# Patient Record
Sex: FEMALE | Race: WHITE | Employment: FULL TIME | URBAN - METROPOLITAN AREA
[De-identification: names, ages, dates, MRNs, and addresses within clinical notes are randomized per-mention and may not be internally consistent; named-entity substitution may affect disease eponyms.]

---

## 2021-04-08 ENCOUNTER — TELEPHONE (OUTPATIENT)
Dept: FAMILY MEDICINE CLINIC | Age: 41
End: 2021-04-08

## 2021-04-08 NOTE — TELEPHONE ENCOUNTER
Called pt, she states that the OB/GYN placed her on Metformin for and that we may have refilled it once before. I explained that we would need to see her before we prescribe a medication for her. She has zero encounters with the office or [employer] in general.     Transferred to the front to schedule an appt to establish care.  JANE

## 2021-04-08 NOTE — TELEPHONE ENCOUNTER
----- Message from [facility] sent at 4/8/2021 12:17 PM EDT -----  Regarding: AMBER Thacker/telphone  General Message/Vendor Calls    Caller's first and last name:      Reason for call: Question      Callback required yes/no and why Y      Best contact number(s):[phone]      Details to clarify the request: Patient stated that she would like to see if she can go back on the metformin?       Teresa Iqbal

## 2021-04-09 ENCOUNTER — OFFICE VISIT (OUTPATIENT)
Dept: FAMILY MEDICINE CLINIC | Age: 41
End: 2021-04-09
Payer: COMMERCIAL

## 2021-04-09 VITALS
HEART RATE: 72 BPM | SYSTOLIC BLOOD PRESSURE: 120 MMHG | RESPIRATION RATE: 20 BRPM | OXYGEN SATURATION: 100 % | BODY MASS INDEX: 47.09 KG/M2 | DIASTOLIC BLOOD PRESSURE: 78 MMHG | HEIGHT: 66 IN | WEIGHT: 293 LBS | TEMPERATURE: 97.8 F

## 2021-04-09 DIAGNOSIS — Z11.59 SCREENING FOR VIRAL DISEASE: ICD-10-CM

## 2021-04-09 DIAGNOSIS — E66.01 MORBID OBESITY (HCC): ICD-10-CM

## 2021-04-09 DIAGNOSIS — R00.2 PALPITATIONS: Primary | ICD-10-CM

## 2021-04-09 DIAGNOSIS — Z13.220 SCREENING, LIPID: ICD-10-CM

## 2021-04-09 DIAGNOSIS — E55.9 VITAMIN D DEFICIENCY: ICD-10-CM

## 2021-04-09 DIAGNOSIS — K21.9 GASTROESOPHAGEAL REFLUX DISEASE WITHOUT ESOPHAGITIS: ICD-10-CM

## 2021-04-09 PROBLEM — R06.83 SNORING: Status: ACTIVE | Noted: 2020-01-29

## 2021-04-09 PROBLEM — E88.81 EXTREME INSULIN RESISTANCE TYPE B: Status: ACTIVE | Noted: 2020-02-15

## 2021-04-09 PROBLEM — Z80.3 FAMILY HISTORY OF MALIGNANT NEOPLASM OF BREAST: Status: ACTIVE | Noted: 2017-02-07

## 2021-04-09 PROCEDURE — 99204 OFFICE O/P NEW MOD 45 MIN: CPT | Performed by: NURSE PRACTITIONER

## 2021-04-09 PROCEDURE — 93000 ELECTROCARDIOGRAM COMPLETE: CPT | Performed by: NURSE PRACTITIONER

## 2021-04-09 PROCEDURE — 36415 COLL VENOUS BLD VENIPUNCTURE: CPT | Performed by: NURSE PRACTITIONER

## 2021-04-09 RX ORDER — OMEPRAZOLE 40 MG/1
40 CAPSULE, DELAYED RELEASE ORAL DAILY
Qty: 30 CAP | Refills: 1 | Status: SHIPPED | OUTPATIENT
Start: 2021-04-09

## 2021-04-09 RX ORDER — METFORMIN HYDROCHLORIDE 500 MG/1
500 TABLET, EXTENDED RELEASE ORAL 2 TIMES DAILY
Qty: 60 TAB | Refills: 5 | Status: SHIPPED | OUTPATIENT
Start: 2021-04-09

## 2021-04-09 RX ORDER — NORGESTIMATE AND ETHINYL ESTRADIOL 0.25-0.035
KIT ORAL
COMMUNITY

## 2021-04-09 RX ORDER — TRIAMCINOLONE ACETONIDE 1 MG/G
CREAM TOPICAL
COMMUNITY
Start: 2021-03-26

## 2021-04-09 NOTE — PROGRESS NOTES
Chief Complaint   Patient presents with    Medication Refill       HPI:     is a 41 y.o. female here to establish care. She has been in good health and is currently using just birth control pills and Kenalog cream for a dry, itchy rash. Previously, Ms. Zamzam Leone was taking metformin which was prescribed by [facility] for weight loss; she found this medication effective, but stopped taking it when she saw a commercial about SEs of metformin. She would like to start using this again for weight loss. She has been having a burning sensation in her epigastric area on occasion for about the past 3 weeks; does not associate with any particular food or activity. It is relieved with Tums. In addition, she has been having occasional episodes of chest heaviness and palpitations, lasting from a few seconds to minutes; these episodes occur randomly and subside without intervention. Last week, she notes that one episode of palpitations was accompanied by bilateral jaw pain. She gets annual mammograms for strong familial history of breast CA; this is due in June. She gets annual Pap screening with [facility]; due again in August.      Not on File    Current Outpatient Medications   Medication Sig    norgestimate-ethinyl estradioL (Estarylla) 0.25-35 mg-mcg tab Estarylla 0.25 mg-35 mcg tablet   1 tablet by mouth everyday    triamcinolone acetonide (KENALOG) 0.1 % topical cream APPLY A VERY SMALL AMOUNT TOPICALLY TO ACTIVE PATCHES ON LOWER LEGS TWICE DAILY AS NEEDED FOR FLARES    omeprazole (PRILOSEC) 40 mg capsule Take 1 Cap by mouth daily.  metFORMIN ER (GLUCOPHAGE XR) 500 mg tablet Take 1 Tab by mouth two (2) times a day. No current facility-administered medications for this visit. No past medical history on file. Family History   Problem Relation Age of Onset   Emery Rae Cancer Mother     Hypertension Father        ROS:  Denies fever, chills, cough, chest pain, SOB,  nausea, vomiting, diarrhea, dysuria. Denies rashes, wounds, arthralgias, weakness, numbness, visual changes, depression. Denies wt loss, wt gain, hemoptysis, hematochezia or melena. Patient is experiencing chest pain radiating to the jaw and/or down the arms. Physical Examination:    /78 (BP 1 Location: Right arm, BP Patient Position: Sitting, BP Cuff Size: Thigh)   Pulse 72   Temp 97.8 °F (36.6 °C) (Temporal)   Resp 20   Ht 5' 6\" (1.676 m)   Wt 297 lb 9.6 oz (135 kg)   SpO2 100%   BMI 48.03 kg/m²     Wt Readings from Last 3 Encounters:   04/09/21 297 lb 9.6 oz (135 kg)       Constitutional: WDWN Female in no acute distress  HENT:  NC/AT  EYES: EOMI, PERRL  Neck:  Supple, no JVD, mass or bruit. No thyromegaly. Respiratory:  Respirations even and unlabored without use of accessory muscles, CTA throughout without wheezes, rales, rubs or rhonchi. Symmetrical chest expansion. Cardiac: RRR, no murmur  Abdomen:  soft, nontender without palpable HSM   Musculoskeletal:  No cyanosis, clubbing or edema of extremities. Moves all extremities without difficulty. Neurologic:  Smooth, even gait without assistance, CN 2-12 grossly intact. Skin: intact and warm to the touch, no rash   Lymphadenopathy: no cervical or supraclavicular nodes  Psych: Pleasant and appropriate. Judgment normal. Alert and oriented x 3. EKG: normal EKG, normal sinus rhythm, there are no previous tracings available for comparison. ASSESSMENT AND PLAN:       ICD-10-CM ICD-9-CM    1. Palpitations  R00.2 785.1 AMB POC EKG ROUTINE W/ 12 LEADS, INTER & REP      COLLECTION VENOUS BLOOD,VENIPUNCTURE      CBC WITH AUTOMATED DIFF      METABOLIC PANEL, COMPREHENSIVE      TSH 3RD GENERATION   2. Morbid obesity (HCC)  E66.01 278.01 COLLECTION VENOUS BLOOD,VENIPUNCTURE      CBC WITH AUTOMATED DIFF      LIPID PANEL      METABOLIC PANEL, COMPREHENSIVE      TSH 3RD GENERATION      HEMOGLOBIN A1C WITH EAG      metFORMIN ER (GLUCOPHAGE XR) 500 mg tablet   3.  Gastroesophageal reflux disease without esophagitis  K21.9 530.81 omeprazole (PRILOSEC) 40 mg capsule   4. Vitamin D deficiency  E55.9 268.9 VITAMIN D, 25 HYDROXY   5. Screening for viral disease  Z11.59 V73.99 HCV AB W/RFLX TO [place]EN   6. Screening, lipid  Z13.220 V77.91 LIPID PANEL       Check labs today. Reviewed records from [facility]. Request records from former PCP. Discussed weight management, restart metformin. Discussed MOA/potential SEs, start at 1 daily and increase to BID after 1 week if tolerating well. Refer to Dr. Cullen Denney for palpitations, chest tightness radiating to jaw. EKG today. Discussed GERD sx. Start Prilosec x6-8 weeks. Discussed lifestyle modifications: elevate head of bed, avoid laying down for 2-3  hours after meal, avoid acidic foods including tomatoes, citrus, chocolate, peppermint, EtOH, carbonated drinks, coffee, juices especially at evening meal. Limit NSAID use. Patient aware of plan of care and verbalized understanding. Questions answered. RTC 3 months or sooner if needed.     Erica Stallworth, AMBER

## 2021-04-09 NOTE — PATIENT INSTRUCTIONS
Palpitations: Care Instructions  Your Care Instructions     Heart palpitations are the uncomfortable sensation that your heart is beating fast or irregularly. You might feel pounding or fluttering in your chest. It might feel like your heart is skipping a beat. Although palpitations may be caused by a heart problem, they also occur because of stress, fatigue, or use of alcohol, caffeine, or nicotine. Many medicines, including diet pills, antihistamines, decongestants, and some herbal products, can cause heart palpitations. Nearly everyone has palpitations from time to time. Depending on your symptoms, your doctor may need to do more tests to try to find the cause of your palpitations. Follow-up care is a key part of your treatment and safety. Be sure to make and go to all appointments, and call your doctor if you are having problems. It's also a good idea to know your test results and keep a list of the medicines you take. How can you care for yourself at home? · Avoid caffeine, nicotine, and excess alcohol. · Do not take illegal drugs, such as methamphetamines and cocaine. · Do not take weight loss or diet medicines unless you talk with your doctor first.  · Get plenty of sleep. · Do not overeat. · If you have palpitations again, take deep breaths and try to relax. This may slow a racing heart. · If you start to feel lightheaded, lie down to avoid injuries that might result if you pass out and fall down. · Keep a record of your palpitations and bring it to your next doctor's appointment. Write down:  ? The date and time. ? Your pulse. (If your heart is beating fast, it may be hard to count your pulse.)  ? What you were doing when the palpitations started. ? How long the palpitations lasted. ? Any other symptoms. · If an activity causes palpitations, slow down or stop. Talk to your doctor before you do that activity again. · Take your medicines exactly as prescribed.  Call your doctor if you think you are having a problem with your medicine. When should you call for help? Call 911 anytime you think you may need emergency care. For example, call if:    · You passed out (lost consciousness).     · You have symptoms of a heart attack. These may include:  ? Chest pain or pressure, or a strange feeling in the chest.  ? Sweating. ? Shortness of breath. ? Pain, pressure, or a strange feeling in the back, neck, jaw, or upper belly or in one or both shoulders or arms. ? Lightheadedness or sudden weakness. ? A fast or irregular heartbeat. After you call 911, the  may tell you to chew 1 adult-strength or 2 to 4 low-dose aspirin. Wait for an ambulance. Do not try to drive yourself.     · You have symptoms of a stroke. These may include:  ? Sudden numbness, tingling, weakness, or loss of movement in your face, arm, or leg, especially on only one side of your body. ? Sudden vision changes. ? Sudden trouble speaking. ? Sudden confusion or trouble understanding simple statements. ? Sudden problems with walking or balance. ? A sudden, severe headache that is different from past headaches. Call your doctor now or seek immediate medical care if:    · You have heart palpitations and:  ? Are dizzy or lightheaded, or you feel like you may faint. ? Have new or increased shortness of breath. Watch closely for changes in your health, and be sure to contact your doctor if:    · You continue to have heart palpitations. Where can you learn more? Go to http://www.gray.com/  Enter R508 in the search box to learn more about \"Palpitations: Care Instructions. \"  Current as of: August 31, 2020               Content Version: 12.8  © 2344-1443 [employer]. Care instructions adapted under license by [employer] (which disclaims liability or warranty for this information).  If you have questions about a medical condition or this instruction, always ask your healthcare professional. [address] any warranty or liability for your use of this information.

## 2021-04-10 LAB
25(OH)D3 SERPL-MCNC: 20.4 NG/ML (ref 30–100)
ALBUMIN SERPL-MCNC: 4.2 G/DL (ref 3.5–5)
ALBUMIN/GLOB SERPL: 1 {RATIO} (ref 1.1–2.2)
ALP SERPL-CCNC: 57 U/L (ref 45–117)
ALT SERPL-CCNC: 35 U/L (ref 12–78)
ANION GAP SERPL CALC-SCNC: 6 MMOL/L (ref 5–15)
AST SERPL-CCNC: 19 U/L (ref 15–37)
BASOPHILS # BLD: 0.1 K/UL (ref 0–0.1)
BASOPHILS NFR BLD: 1 % (ref 0–1)
BILIRUB SERPL-MCNC: 0.7 MG/DL (ref 0.2–1)
BUN SERPL-MCNC: 14 MG/DL (ref 6–20)
BUN/CREAT SERPL: 21 (ref 12–20)
CALCIUM SERPL-MCNC: 9.1 MG/DL (ref 8.5–10.1)
CHLORIDE SERPL-SCNC: 107 MMOL/L (ref 97–108)
CHOLEST SERPL-MCNC: 267 MG/DL
CO2 SERPL-SCNC: 25 MMOL/L (ref 21–32)
CREAT SERPL-MCNC: 0.68 MG/DL (ref 0.55–1.02)
DIFFERENTIAL METHOD BLD: ABNORMAL
EOSINOPHIL # BLD: 0.1 K/UL (ref 0–0.4)
EOSINOPHIL NFR BLD: 1 % (ref 0–7)
ERYTHROCYTE [DISTWIDTH] IN BLOOD BY AUTOMATED COUNT: 13.1 % (ref 11.5–14.5)
EST. AVERAGE GLUCOSE BLD GHB EST-MCNC: 91 MG/DL
GLOBULIN SER CALC-MCNC: 4.1 G/DL (ref 2–4)
GLUCOSE SERPL-MCNC: 76 MG/DL (ref 65–100)
HBA1C MFR BLD: 4.8 % (ref 4–5.6)
HCT VFR BLD AUTO: 48 % (ref 35–47)
HDLC SERPL-MCNC: 84 MG/DL
HDLC SERPL: 3.2 {RATIO} (ref 0–5)
HGB BLD-MCNC: 15.7 G/DL (ref 11.5–16)
IMM GRANULOCYTES # BLD AUTO: 0 K/UL (ref 0–0.04)
IMM GRANULOCYTES NFR BLD AUTO: 0 % (ref 0–0.5)
LDLC SERPL CALC-MCNC: 159.2 MG/DL (ref 0–100)
LIPID PROFILE,FLP: ABNORMAL
LYMPHOCYTES # BLD: 2.7 K/UL (ref 0.8–3.5)
LYMPHOCYTES NFR BLD: 34 % (ref 12–49)
MCH RBC QN AUTO: 29.5 PG (ref 26–34)
MCHC RBC AUTO-ENTMCNC: 32.7 G/DL (ref 30–36.5)
MCV RBC AUTO: 90.1 FL (ref 80–99)
MONOCYTES # BLD: 0.6 K/UL (ref 0–1)
MONOCYTES NFR BLD: 7 % (ref 5–13)
NEUTS SEG # BLD: 4.7 K/UL (ref 1.8–8)
NEUTS SEG NFR BLD: 57 % (ref 32–75)
NRBC # BLD: 0 K/UL (ref 0–0.01)
NRBC BLD-RTO: 0 PER 100 WBC
PLATELET # BLD AUTO: 237 K/UL (ref 150–400)
PMV BLD AUTO: 10.9 FL (ref 8.9–12.9)
POTASSIUM SERPL-SCNC: 3.7 MMOL/L (ref 3.5–5.1)
PROT SERPL-MCNC: 8.3 G/DL (ref 6.4–8.2)
RBC # BLD AUTO: 5.33 M/UL (ref 3.8–5.2)
SODIUM SERPL-SCNC: 138 MMOL/L (ref 136–145)
TRIGL SERPL-MCNC: 119 MG/DL (ref ?–150)
TSH SERPL DL<=0.05 MIU/L-ACNC: 3.01 UIU/ML (ref 0.36–3.74)
VLDLC SERPL CALC-MCNC: 23.8 MG/DL
WBC # BLD AUTO: 8.1 K/UL (ref 3.6–11)

## 2021-04-12 LAB
HCV AB S/CO SERPL IA: <0.1 S/CO RATIO (ref 0–0.9)
HCV AB SERPL QL IA: NORMAL

## 2021-04-12 NOTE — PROGRESS NOTES
Patient verified by stating name and date of birth.  Patient informed of lab results and states understanding per [place]

## 2021-04-12 NOTE — PROGRESS NOTES
Please call patient. No diabetes, no prediabetes. Vitamin D is low. Make sure she is taking 2000 units of OTC vitamin D daily. Thyroid, liver, kidneys, electrolytes look good. Her cholesterol is way too high. This increases her risk of heart attack or stroke. Work hard on diet, exercise, weight loss, and re-check in 6 months.

## 2021-04-13 ENCOUNTER — OFFICE VISIT (OUTPATIENT)
Dept: CARDIOLOGY CLINIC | Age: 41
End: 2021-04-13
Payer: COMMERCIAL

## 2021-04-13 ENCOUNTER — CLINICAL SUPPORT (OUTPATIENT)
Dept: CARDIOLOGY CLINIC | Age: 41
End: 2021-04-13

## 2021-04-13 VITALS
OXYGEN SATURATION: 98 % | HEART RATE: 73 BPM | RESPIRATION RATE: 18 BRPM | WEIGHT: 293 LBS | SYSTOLIC BLOOD PRESSURE: 140 MMHG | BODY MASS INDEX: 47.09 KG/M2 | HEIGHT: 66 IN | DIASTOLIC BLOOD PRESSURE: 90 MMHG | TEMPERATURE: 98.2 F

## 2021-04-13 DIAGNOSIS — R00.2 PALPITATIONS: ICD-10-CM

## 2021-04-13 DIAGNOSIS — K21.9 GASTROESOPHAGEAL REFLUX DISEASE WITHOUT ESOPHAGITIS: ICD-10-CM

## 2021-04-13 DIAGNOSIS — R07.2 PRECORDIAL PAIN: Primary | ICD-10-CM

## 2021-04-13 DIAGNOSIS — E66.01 MORBID OBESITY (HCC): ICD-10-CM

## 2021-04-13 DIAGNOSIS — E78.5 DYSLIPIDEMIA: ICD-10-CM

## 2021-04-13 DIAGNOSIS — Z82.49 FH: CAD (CORONARY ARTERY DISEASE): ICD-10-CM

## 2021-04-13 PROCEDURE — 99203 OFFICE O/P NEW LOW 30 MIN: CPT | Performed by: INTERNAL MEDICINE

## 2021-04-13 PROCEDURE — 93227 XTRNL ECG REC<48 HR R&I: CPT | Performed by: INTERNAL MEDICINE

## 2021-04-13 PROCEDURE — 93225 XTRNL ECG REC<48 HRS REC: CPT | Performed by: INTERNAL MEDICINE

## 2021-04-13 NOTE — PROGRESS NOTES
Verified patient with two patient identifiers. Medications reviewed/approved by Dr. Osmar Demarco. Chief Complaint   Patient presents with    New Patient     Referred by Thomas Guardado NP    Palpitations     \"starts with a burning in the stomach, pressure in the chest, heart races and then slows. Pain radiates to the jaw. \" w/ rest, activity. Omepreazole is not helping per pt. 1. Have you been to the ER, urgent care clinic since your last visit? Hospitalized since your last visit? new pt.    2. Have you seen or consulted any other health care providers outside of the [address] [place] [address] since your last visit? Include any pap smears or colon screening. New pt.

## 2021-04-13 NOTE — PROGRESS NOTES
Rita Norton is a 41 y.o. female is here for cardiac evaluation--palpitations, chest pain. No prior cardiac hx. Hx obesity (on metformin for weight loss, not diabetic), seen by PCP recently. Sx of palpitations--lasting mins to longer at times, heartburn/indigestion with CP radiating to neck/jaw--non-exertional.  Labs as noted--dyslipidemia, glc intolerance/insulin resistance. +FH CAD (father). .  The patient denies shortness of breath, orthopnea, PND, LE edema, , syncope, presyncope or fatigue. Patient Active Problem List    Diagnosis Date Noted    Extreme insulin resistance type B 02/15/2020    Vitamin D deficiency 02/15/2020    Snoring 01/29/2020    Family history of malignant neoplasm of breast 02/07/2017    Morbid obesity ([facility] [address] 06/10/2015    Stress incontinence 02/26/2014      Jana Kilpatrick NP  No past medical history on file.    Past Surgical History:   Procedure Laterality Date    HX LITHOTRIPSY  2016     No Known Allergies   Family History   Problem Relation Age of Onset    Cancer Mother     Hypertension Father       Social History     Socioeconomic History    Marital status:      Spouse name: Not on file    Number of children: 2    Years of education: 15    Highest education level: Not on file   Occupational History    Not on file   Social Needs    Financial resource strain: Not hard at all   [employer] insecurity     Worry: Never true     Inability: Never true   [employer] needs     Medical: No     Non-medical: Not on file   Tobacco Use    Smoking status: Never Smoker    Smokeless tobacco: Never Used   Substance and Sexual Activity    Alcohol use: Yes     Frequency: 2-4 times a month     Drinks per session: 1 or 2     Binge frequency: Never    Drug use: Never    Sexual activity: Not on file   Lifestyle    Physical activity     Days per week: 0 days     Minutes per session: Not on file    Stress: Not at all   Relationships    Social connections Talks on phone: More than three times a week     Gets together: More than three times a week     Attends Alevism service: Never     Active member of club or organization: No     Attends meetings of clubs or organizations: Never     Relationship status:     Intimate partner violence     Fear of current or ex partner: No     Emotionally abused: No     Physically abused: No     Forced sexual activity: No   Other Topics Concern    Not on file   Social History Narrative    Not on file      Current Outpatient Medications   Medication Sig    norgestimate-ethinyl estradioL (Estarylla) 0.25-35 mg-mcg tab Estarylla 0.25 mg-35 mcg tablet   1 tablet by mouth everyday    triamcinolone acetonide (KENALOG) 0.1 % topical cream APPLY A VERY SMALL AMOUNT TOPICALLY TO ACTIVE PATCHES ON LOWER LEGS TWICE DAILY AS NEEDED FOR FLARES    omeprazole (PRILOSEC) 40 mg capsule Take 1 Cap by mouth daily.  metFORMIN ER (GLUCOPHAGE XR) 500 mg tablet Take 1 Tab by mouth two (2) times a day. (Patient taking differently: Take 500 mg by mouth daily (with dinner). Every day this week and bid next (starting April 19th)  Indications: weight loss)     No current facility-administered medications for this visit. Review of Symptoms:    CONST  No weight change. No fever, chills, sweats    ENT No visual changes, URI sx, sore throat    CV  See HPI   RESP  No cough, or sputum, wheezing. Also see HPI   GI  No abdominal pain or change in bowel habits. No heartburn or dysphagia. No melena or rectal bleeding.   No dysuria, urgency, frequency, hematuria   MSKEL  No joint pain, swelling. No muscle pain. SKIN  No rash or lesions. NEURO  No headache, syncope, or seizure. No weakness, loss of sensation, or paresthesias. PSYCH  No low mood or depression  No anxiety. HE/LYMPH  No easy bruising, abnormal bleeding, or enlarged glands.         Physical ExamPhysical Exam:    Visit Vitals  BP (!) 140/90 (BP 1 Location: Left lower arm, BP Patient Position: Sitting, BP Cuff Size: Adult)   Pulse 73   Temp 98.2 °F (36.8 °C) (Temporal)   Resp 18   Ht 5' 6\" (1.676 m)   Wt 299 lb (135.6 kg)   SpO2 98% Comment: ra   BMI 48.26 kg/m²     Gen: NAD  HEENT:  PERRL, throat clear  Neck: no adenopathy, no thyromegaly, no JVD   Heart:  Regular,Nl S1S2,  no murmur, gallop or rub. Lungs:  clear  Abdomen:   Soft, non-tender, bowel sounds are active. Extremities:  No edema  Pulse: symmetric  Neuro: A&O times 3, No focal neuro deficits    Cardiographics    ECG: from 4/9/21--NSR, wnl    Labs:   Lab Results   Component Value Date/Time    Sodium 138 04/09/2021 08:26 PM    Potassium 3.7 04/09/2021 08:26 PM    Chloride 107 04/09/2021 08:26 PM    CO2 25 04/09/2021 08:26 PM    Anion gap 6 04/09/2021 08:26 PM    Glucose 76 04/09/2021 08:26 PM    BUN 14 04/09/2021 08:26 PM    Creatinine 0.68 04/09/2021 08:26 PM    BUN/Creatinine ratio 21 (H) 04/09/2021 08:26 PM    GFR est AA >60 04/09/2021 08:26 PM    GFR est non-AA >60 04/09/2021 08:26 PM    Calcium 9.1 04/09/2021 08:26 PM    Bilirubin, total 0.7 04/09/2021 08:26 PM    Alk. phosphatase 57 04/09/2021 08:26 PM    Protein, total 8.3 (H) 04/09/2021 08:26 PM    Albumin 4.2 04/09/2021 08:26 PM    Globulin 4.1 (H) 04/09/2021 08:26 PM    A-G Ratio 1.0 (L) 04/09/2021 08:26 PM    ALT (SGPT) 35 04/09/2021 08:26 PM     No results found for: CPK, CPKX, CPX  Lab Results   Component Value Date/Time    Cholesterol, total 267 (H) 04/09/2021 08:26 PM    HDL Cholesterol 84 04/09/2021 08:26 PM    LDL, calculated 159.2 (H) 04/09/2021 08:26 PM    Triglyceride 119 04/09/2021 08:26 PM    CHOL/HDL Ratio 3.2 04/09/2021 08:26 PM     No results found for this or any previous visit.     Assessment:         Patient Active Problem List    Diagnosis Date Noted    Extreme insulin resistance type B 02/15/2020    Vitamin D deficiency 02/15/2020    Snoring 01/29/2020    Family history of malignant neoplasm of breast 02/07/2017    Morbid obesity ([facility] [address] 06/10/2015    Stress incontinence 02/26/2014     41 y.o. female is here for cardiac evaluation--palpitations, chest pain. No prior cardiac hx. Hx obesity (on metformin for weight loss, not diabetic), seen by PCP recently. Sx of palpitations--lasting mins to longer at times, heartburn/indigestion with CP radiating to neck/jaw--non-exertional.  Labs as noted--dyslipidemia, glc intolerance/insulin resistance. +FH CAD (father). Plan:     Stress treadmill EKG--CP to jaw, CV risks (obesity, FH, dyslipidemia)  Holter monitor --palpitations  Echo/doppler--r/o structural heart disease  Continue current meds incl PPI  ? sleep study  Labs per PCP  Dyslipidemia--start with dietary rx at this point  Fu after testing to discuss  Fu with PCP as planned    Suleman Shay MD

## 2021-04-13 NOTE — PROGRESS NOTES
OFFICE  hook up  HOLTER 48 hr  monitor only. Verified patient with two patient identifiers. Patient verbalized understanding of its use. Ordering JASON Ocampo Officer JASON Casillas  Reason: palpitations  Start time: 9:49 AM    Patient has been successfully enrolled through [employer]. No LOS.

## 2021-04-23 ENCOUNTER — TELEPHONE (OUTPATIENT)
Dept: CARDIOLOGY CLINIC | Age: 41
End: 2021-04-23

## 2021-04-23 NOTE — TELEPHONE ENCOUNTER
Pt called to get results of her Holter Monitor report. Pt is scheduled for a stress test on Monday and her Insurance called yesterday to say that they would not cover the cost of the stress test due to her deductible. She would like to know the results of the Monitor prior to the test so that she know if she need the stress test or not.   Please call [phone]

## 2021-04-26 ENCOUNTER — PATIENT MESSAGE (OUTPATIENT)
Dept: CARDIOLOGY CLINIC | Age: 41
End: 2021-04-26

## 2021-04-26 ENCOUNTER — HOSPITAL ENCOUNTER (OUTPATIENT)
Dept: NON INVASIVE DIAGNOSTICS | Age: 41
Discharge: HOME | End: 2021-04-26
Attending: INTERNAL MEDICINE
Payer: COMMERCIAL

## 2021-04-26 VITALS
SYSTOLIC BLOOD PRESSURE: 140 MMHG | BODY MASS INDEX: 47.09 KG/M2 | WEIGHT: 293 LBS | DIASTOLIC BLOOD PRESSURE: 90 MMHG | HEIGHT: 66 IN

## 2021-04-26 DIAGNOSIS — R00.2 PALPITATIONS: ICD-10-CM

## 2021-04-26 DIAGNOSIS — Z82.49 FH: CAD (CORONARY ARTERY DISEASE): ICD-10-CM

## 2021-04-26 DIAGNOSIS — R07.2 PRECORDIAL PAIN: ICD-10-CM

## 2021-04-26 DIAGNOSIS — E66.01 MORBID OBESITY (HCC): ICD-10-CM

## 2021-04-26 DIAGNOSIS — E78.5 DYSLIPIDEMIA: ICD-10-CM

## 2021-04-26 LAB
STRESS ANGINA INDEX: 0
STRESS BASELINE DIAS BP: 74 MMHG
STRESS BASELINE HR: 82 BPM
STRESS BASELINE SYS BP: 130 MMHG
STRESS ESTIMATED WORKLOAD: 4.9 METS
STRESS EXERCISE DUR MIN: NORMAL
STRESS O2 SAT PEAK: 96 %
STRESS O2 SAT REST: 99 %
STRESS PEAK DIAS BP: 94 MMHG
STRESS PEAK SYS BP: 186 MMHG
STRESS PERCENT HR ACHIEVED: 91 %
STRESS POST PEAK HR: 162 BPM
STRESS RATE PRESSURE PRODUCT: NORMAL BPM*MMHG
STRESS TARGET HR: 178 BPM

## 2021-04-26 PROCEDURE — 93017 CV STRESS TEST TRACING ONLY: CPT

## 2021-04-26 PROCEDURE — 93306 TTE W/DOPPLER COMPLETE: CPT

## 2021-04-27 LAB
ECHO AV AREA PEAK VELOCITY: 2.88 CM2
ECHO AV AREA/BSA PEAK VELOCITY: 1.2 CM2/M2
ECHO AV PEAK GRADIENT: 7.49 MMHG
ECHO AV PEAK VELOCITY: 136.84 CM/S
ECHO EST RA PRESSURE: 10 MMHG
ECHO LA TO AORTIC ROOT RATIO: 1.24
ECHO LV E' LATERAL VELOCITY: 16.03 CM/S
ECHO LV E' SEPTAL VELOCITY: 12.02 CM/S
ECHO LV INTERNAL DIMENSION DIASTOLIC: 3.52 CM (ref 3.9–5.3)
ECHO LV INTERNAL DIMENSION SYSTOLIC: 2.91 CM
ECHO LV IVSD: 1.1 CM (ref 0.6–0.9)
ECHO LV MASS 2D: 127.5 G (ref 67–162)
ECHO LV MASS INDEX 2D: 53.7 G/M2 (ref 43–95)
ECHO LV POSTERIOR WALL DIASTOLIC: 1.19 CM (ref 0.6–0.9)
ECHO LVOT DIAM: 1.99 CM
ECHO LVOT PEAK GRADIENT: 6.47 MMHG
ECHO LVOT PEAK VELOCITY: 127.16 CM/S
ECHO MV A VELOCITY: 107.59 CM/S
ECHO MV E DECELERATION TIME (DT): 116.43 MS
ECHO MV E VELOCITY: 115.96 CM/S
ECHO MV E/A RATIO: 1.08
ECHO MV E/E' LATERAL: 7.23
ECHO MV E/E' RATIO (AVERAGED): 8.44
ECHO MV E/E' SEPTAL: 9.65
ECHO PV REGURGITANT MAX VELOCITY: 75.68 CM/S
ECHO RIGHT VENTRICULAR SYSTOLIC PRESSURE (RVSP): 34.07 MMHG
ECHO RV INTERNAL DIMENSION: 3.16 CM
ECHO TV REGURGITANT MAX VELOCITY: 145.01 CM/S
ECHO TV REGURGITANT MAX VELOCITY: 245.29 CM/S
ECHO TV REGURGITANT PEAK GRADIENT: 24.07 MMHG

## 2021-04-28 ENCOUNTER — TELEPHONE (OUTPATIENT)
Dept: FAMILY MEDICINE CLINIC | Age: 41
End: 2021-04-28

## 2021-04-28 NOTE — TELEPHONE ENCOUNTER
Advise stress treadmill EKG test normal.  Echo with normal pumping function, valves ok. Has mild LVH (no hx of hypertension)--would home monitor BP, etc.  Diet, weight loss, fu with PCP.  RTC 6 mos, sooner prn.  HM: \"Only occasional PAC\"s, PVC\"s--no concerns.  Other tests pending. \" - Thanks, Dr. Dada Espinoza  Thanks [facility]     Spoke with the patient. Verified patient with two patient identifiers. Results given and questions answered. Patient verbalized understanding.

## 2021-04-28 NOTE — TELEPHONE ENCOUNTER
These measurements are of the left ventricle wall thickness--increased, this is what LV hypertrophy is as mentioned--usually from hyperntension, and recommend BP monitoring at home as discussed--may need rx[place] [facility] [place]     Spoke with the patient. Verified patient with two patient identifiers. Results given and questions answered. Patient verbalized understanding.

## 2021-04-28 NOTE — TELEPHONE ENCOUNTER
Regarding: FW: results  These measurements are of the left ventricle wall thickness--increased, this is what LV hypertrophy is as mentioned--usually from hyperntension, and recommend BP monitoring at home as discussed--may need rx. Thanks [facility]  ----- Message -----  From: Kenna Boone LPN  Sent: 0/64/2279   9:04 AM EDT  To: Flower Crystal MD  Subject: results                                          ----- Message from Liliana Solano LPN sent at 1/03/8596  9:04 AM EDT -----  LVIDd   3.52 cm (3.90 - 5.30)    IVSd   1.1 cm (0.60 - 0.90)    LVPWd   1.19 cm (0.60 - 0.90)      Pt wants education on the above information. I will call her back - thanks.     ----- Message sent from Kenna Boone LPN to Keyana Lester at 4/26/2021  9:04 AM -----   Pieter Nagy,     Dr. Reilly Marrero has reviewed your results. Please see below. \"Only occasional PAC\"s, PVC\"s--no concerns. Other tests pending. \" - Thanks, Dr. Reilly Marrero    PAC's & PVC's (extra heart beats that interrupt your natural rhythm and causes skipped beats - not concerning). Stress, excitement, caffeine, lack of sleep can exacerbate the skipped beats. We will keep you informed regarding upcoming test results.      Take care,  Leland Solis LPN

## 2021-04-28 NOTE — TELEPHONE ENCOUNTER
----- Message from April M Kandis Uriostegui sent at 4/28/2021  1:24 PM EDT -----  Regarding: Megan No NP/telephone  General Message/Vendor Calls    Caller's first and last name:      Reason for call: Requested a call back       Callback required yes/no and why: Yes       Best contact number(s): [phone]      Details to clarify the request: Patient went to see a cardiologist and her test results came back and she was advised to speak with Nishi Carrizales because she would need blood pressure pills with fluid pills combined within them.        April [address] Luther

## 2021-04-30 ENCOUNTER — VIRTUAL VISIT (OUTPATIENT)
Dept: FAMILY MEDICINE CLINIC | Age: 41
End: 2021-04-30
Payer: COMMERCIAL

## 2021-04-30 DIAGNOSIS — I51.7 MILD CONCENTRIC LEFT VENTRICULAR HYPERTROPHY: ICD-10-CM

## 2021-04-30 DIAGNOSIS — I10 ESSENTIAL HYPERTENSION: Primary | ICD-10-CM

## 2021-04-30 PROCEDURE — 99214 OFFICE O/P EST MOD 30 MIN: CPT | Performed by: NURSE PRACTITIONER

## 2021-04-30 RX ORDER — HYDROCHLOROTHIAZIDE 12.5 MG/1
12.5 TABLET ORAL DAILY
Qty: 30 TAB | Refills: 0 | Status: SHIPPED | OUTPATIENT
Start: 2021-04-30 | End: 2021-06-03 | Stop reason: SDUPTHER

## 2021-04-30 NOTE — PROGRESS NOTES
Chief Complaint   Patient presents with    Medication Evaluation     1. Have you been to the ER, urgent care clinic since your last visit? Hospitalized since your last visit? No    2. Have you seen or consulted any other health care providers outside of the [address] [place] [address] since your last visit? Include any pap smears or colon screening. No  Fall Risk Assessment, last 12 mths 4/30/2021   Able to walk? Yes   Fall in past 12 months? 0   Do you feel unsteady? 0   Are you worried about falling 0     Abuse Screening Questionnaire 4/30/2021   Do you ever feel afraid of your partner? N   Are you in a relationship with someone who physically or mentally threatens you? N   Is it safe for you to go home?  Y     3 most recent PHQ Screens 4/30/2021   Little interest or pleasure in doing things Not at all   Feeling down, depressed, irritable, or hopeless Not at all   Total Score PHQ 2 0     Learning Assessment 4/30/2021   PRIMARY LEARNER Patient   HIGHEST LEVEL OF EDUCATION - PRIMARY LEARNER  GRADUATED HIGH SCHOOL OR GED   BARRIERS PRIMARY LEARNER NONE   CO-LEARNER CAREGIVER No   PRIMARY LANGUAGE ENGLISH   LEARNER PREFERENCE PRIMARY READING   ANSWERED BY Patient   RELATIONSHIP SELF

## 2021-04-30 NOTE — PATIENT INSTRUCTIONS
High Blood Pressure: Care Instructions Overview It's normal for blood pressure to go up and down throughout the day. But if it stays up, you have high blood pressure. Another name for high blood pressure is hypertension. Despite what a lot of people think, high blood pressure usually doesn't cause headaches or make you feel dizzy or lightheaded. It usually has no symptoms. But it does increase your risk of stroke, heart attack, and other problems. You and your doctor will talk about your risks of these problems based on your blood pressure. Your doctor will give you a goal for your blood pressure. Your goal will be based on your health and your age. Lifestyle changes, such as eating healthy and being active, are always important to help lower blood pressure. You might also take medicine to reach your blood pressure goal. 
Follow-up care is a key part of your treatment and safety. Be sure to make and go to all appointments, and call your doctor if you are having problems. It's also a good idea to know your test results and keep a list of the medicines you take. How can you care for yourself at home? Medical treatment · If you stop taking your medicine, your blood pressure will go back up. You may take one or more types of medicine to lower your blood pressure. Be safe with medicines. Take your medicine exactly as prescribed. Call your doctor if you think you are having a problem with your medicine. · Talk to your doctor before you start taking aspirin every day. Aspirin can help certain people lower their risk of a heart attack or stroke. But taking aspirin isn't right for everyone, because it can cause serious bleeding. · See your doctor regularly. You may need to see the doctor more often at first or until your blood pressure comes down. · If you are taking blood pressure medicine, talk to your doctor before you take decongestants or anti-inflammatory medicine, such as ibuprofen.  Some of these medicines can raise blood pressure. · Learn how to check your blood pressure at home. Lifestyle changes · Stay at a healthy weight. This is especially important if you put on weight around the waist. Losing even 10 pounds can help you lower your blood pressure. · If your doctor recommends it, get more exercise. Walking is a good choice. Bit by bit, increase the amount you walk every day. Try for at least 30 minutes on most days of the week. You also may want to swim, bike, or do other activities. · Avoid or limit alcohol. Talk to your doctor about whether you can drink any alcohol. · Try to limit how much sodium you eat to less than 2,300 milligrams (mg) a day. Your doctor may ask you to try to eat less than 1,500 mg a day. · Eat plenty of fruits (such as bananas and oranges), vegetables, legumes, whole grains, and low-fat dairy products. · Lower the amount of saturated fat in your diet. Saturated fat is found in animal products such as milk, cheese, and meat. Limiting these foods may help you lose weight and also lower your risk for heart disease. · Do not smoke. Smoking increases your risk for heart attack and stroke. If you need help quitting, talk to your doctor about stop-smoking programs and medicines. These can increase your chances of quitting for good. When should you call for help? Call  911 anytime you think you may need emergency care. This may mean having symptoms that suggest that your blood pressure is causing a serious heart or blood vessel problem. Your blood pressure may be over 180/120. For example, call 911 if: 
  · You have symptoms of a heart attack. These may include: 
? Chest pain or pressure, or a strange feeling in the chest. 
? Sweating. ? Shortness of breath. ? Nausea or vomiting. ? Pain, pressure, or a strange feeling in the back, neck, jaw, or upper belly or in one or both shoulders or arms. ? Lightheadedness or sudden weakness.  
? A fast or irregular heartbeat.  
  · You have symptoms of a stroke. These may include: 
? Sudden numbness, tingling, weakness, or loss of movement in your face, arm, or leg, especially on only one side of your body. ? Sudden vision changes. ? Sudden trouble speaking. ? Sudden confusion or trouble understanding simple statements. ? Sudden problems with walking or balance. ? A sudden, severe headache that is different from past headaches.  
  · You have severe back or belly pain. Do not wait until your blood pressure comes down on its own. Get help right away. Call your doctor now or seek immediate care if: 
  · Your blood pressure is much higher than normal (such as 180/120 or higher), but you don't have symptoms.  
  · You think high blood pressure is causing symptoms, such as: 
? Severe headache. 
? Blurry vision. Watch closely for changes in your health, and be sure to contact your doctor if: 
  · Your blood pressure measures higher than your doctor recommends at least 2 times. That means the top number is higher or the bottom number is higher, or both.  
  · You think you may be having side effects from your blood pressure medicine. Where can you learn more? Go to http://iglesia-william.info/ Enter W748 in the search box to learn more about \"High Blood Pressure: Care Instructions. \" Current as of: August 31, 2020               Content Version: 12.8 © 2006-2021 Healthwise, Incorporated. Care instructions adapted under license by [employer] (which disclaims liability or warranty for this information). If you have questions about a medical condition or this instruction, always ask your healthcare professional. [place] [address] any warranty or liability for your use of this information.

## 2021-04-30 NOTE — PROGRESS NOTES
Oleksandr Haynes is a 41 y.o. female who was seen by synchronous (real-time) audio-video technology on 4/30/2021. This encounter was conducted via D[employer] me. Consent:  She and/or her healthcare decision maker is aware that this patient-initiated Telehealth encounter is a billable service, with coverage as determined by her insurance carrier. She is aware that she may receive a bill and has provided verbal consent to proceed: Yes    I was in the office while conducting this encounter. 712  Subjective:   HPI: Oleksandr Haynes was seen for Medication Evaluation    Weight management: On metformin. A1c acceptable.      Cardiac: Was having palpitations, jaw pain. Negative stress test, Holter with rare PACs, PVCs. Echo with EF 55-60%, mild left ventricular hypertrophy.     Seen today to follow up echo. She reports her BP has been labile at home, 071-555 systolic. Dr. Herrera Baker mentioned starting a medication so she is seen to discuss. No Known Allergies    Current Outpatient Medications   Medication Sig    hydroCHLOROthiazide (HYDRODIURIL) 12.5 mg tablet Take 1 Tab by mouth daily.  norgestimate-ethinyl estradioL (Estarylla) 0.25-35 mg-mcg tab Estarylla 0.25 mg-35 mcg tablet   1 tablet by mouth everyday    triamcinolone acetonide (KENALOG) 0.1 % topical cream APPLY A VERY SMALL AMOUNT TOPICALLY TO ACTIVE PATCHES ON LOWER LEGS TWICE DAILY AS NEEDED FOR FLARES    omeprazole (PRILOSEC) 40 mg capsule Take 1 Cap by mouth daily.  metFORMIN ER (GLUCOPHAGE XR) 500 mg tablet Take 1 Tab by mouth two (2) times a day. (Patient taking differently: Take 500 mg by mouth daily (with dinner). Every day this week and bid next (starting April 19th)  Indications: weight loss)     No current facility-administered medications for this visit.         Past Medical History:   Diagnosis Date    Dyslipidemia 4/13/2021       Family History   Problem Relation Age of Onset    Cancer Mother     Hypertension Father        ROS:  Denies fever, chills, cough, chest pain, SOB,  nausea, vomiting, diarrhea, dysuria. Denies rashes, wounds, arthralgias, weakness, numbness, visual changes, depression. Denies wt loss, wt gain, hemoptysis, hematochezia or melena. Patient is not experiencing chest pain radiating to the jaw and/or down the arms. PHYSICAL EXAMINATION:    Vital Signs: (As obtained by patient/caregiver at home)  There were no vitals taken for this visit. Constitutional: [x] Appears well-developed and well-nourished [x] No apparent distress      [] Abnormal -     Mental status: [x] Alert and awake  [x] Oriented to person/place/time [x] Able to follow commands    [] Abnormal -     Eyes:   EOM    [x]  Normal    [] Abnormal -   Sclera  [x]  Normal    [] Abnormal -          Discharge [x]  None visible   [] Abnormal -     HENT: [x] Normocephalic, atraumatic  [] Abnormal -   [x] Mouth/Throat: Mucous membranes are moist    External Ears [x] Normal  [] Abnormal -    Neck: [x] No visualized mass [] Abnormal -     Pulmonary/Chest: [x] Respiratory effort normal   [x] No visualized signs of difficulty breathing or respiratory distress        [] Abnormal -      Musculoskeletal:   [x] Normal gait with no signs of ataxia         [x] Normal range of motion of neck        [] Abnormal -     Neurological:        [x] No Facial Asymmetry (Cranial nerve 7 motor function) (limited exam due to video visit)          [x] No gaze palsy        [] Abnormal -          Skin:        [x] No significant exanthematous lesions or discoloration noted on facial skin         [] Abnormal -            Psychiatric:       [x] Normal Affect [] Abnormal -        [x] No Hallucinations    Other pertinent observable physical exam findings:-        Assessment & Plan:       ICD-10-CM ICD-9-CM    1. Essential hypertension  I10 401.9 hydroCHLOROthiazide (HYDRODIURIL) 12.5 mg tablet   2.  Mild concentric left ventricular hypertrophy  I51.7 429.3 hydroCHLOROthiazide (HYDRODIURIL) 12.5 mg tablet Reviewed testing results at length with the patient. Start low dose HCTZ. Monitor BP closely. Discussed MOA, potential s/e, s/s of hypotension. Patient aware of plan of care and verbalized understanding. Questions answered. RTC 1 month via phone, MyChart, or sooner if needed. We discussed the expected course, resolution and complications of the diagnosis(es) in detail. Medication risks, benefits, costs, interactions, and alternatives were discussed as indicated. I advised her to contact the office if her condition worsens, changes or fails to improve as anticipated. She expressed understanding with the diagnosis(es) and plan. Pursuant to the emergency declaration under the [facility], [facility] waiver authority and the [employer] and [employer] Act, this Virtual  Visit was conducted, with patient's consent, to reduce the patient's risk of exposure to COVID-19 and provide continuity of care for an established patient. Services were provided through a video synchronous discussion virtually to substitute for in-person clinic visit.     Duglas Bear NP

## 2021-06-03 ENCOUNTER — TELEPHONE (OUTPATIENT)
Dept: FAMILY MEDICINE CLINIC | Age: 41
End: 2021-06-03

## 2021-06-03 DIAGNOSIS — I51.7 MILD CONCENTRIC LEFT VENTRICULAR HYPERTROPHY: ICD-10-CM

## 2021-06-03 DIAGNOSIS — I10 ESSENTIAL HYPERTENSION: ICD-10-CM

## 2021-06-03 RX ORDER — HYDROCHLOROTHIAZIDE 12.5 MG/1
12.5 TABLET ORAL DAILY
Qty: 90 TABLET | Refills: 3 | Status: SHIPPED | OUTPATIENT
Start: 2021-06-03

## 2021-06-03 NOTE — TELEPHONE ENCOUNTER
----- Message from [facility] sent at 6/3/2021  9:29 AM EDT -----  Regarding: NP Thacker/refill  Medication Refill    Caller (if not patient):N/A      Relationship of caller (if not patient):N/A      Best contact number(s):[phone]      Name of medication and dosage if known:\"Hydrochlorothiazide\" 12.5mg      Is patient out of this medication (yes/no): Y      Pharmacy name:[facility]    Pharmacy listed in chart? (yes/no): Y  Pharmacy phone [phone]      Details to clarify the request:Patient stated that her blood pressure has been great since put on medication.       Teresa Iqbal

## 2021-10-21 ENCOUNTER — VIRTUAL VISIT (OUTPATIENT)
Dept: FAMILY MEDICINE CLINIC | Age: 41
End: 2021-10-21
Payer: COMMERCIAL

## 2021-10-21 ENCOUNTER — TELEPHONE (OUTPATIENT)
Dept: FAMILY MEDICINE CLINIC | Age: 41
End: 2021-10-21

## 2021-10-21 DIAGNOSIS — J01.00 ACUTE NON-RECURRENT MAXILLARY SINUSITIS: ICD-10-CM

## 2021-10-21 DIAGNOSIS — J02.9 SORE THROAT: Primary | ICD-10-CM

## 2021-10-21 PROCEDURE — 99442 PR PHYS/QHP TELEPHONE EVALUATION 11-20 MIN: CPT | Performed by: NURSE PRACTITIONER

## 2021-10-21 RX ORDER — METHYLPREDNISOLONE 4 MG/1
TABLET ORAL
Qty: 15 TABLET | Refills: 0 | Status: SHIPPED | OUTPATIENT
Start: 2021-10-21 | End: 2022-01-14 | Stop reason: ALTCHOICE

## 2021-10-21 RX ORDER — AZITHROMYCIN 250 MG/1
TABLET, FILM COATED ORAL
Qty: 6 TABLET | Refills: 0 | Status: SHIPPED | OUTPATIENT
Start: 2021-10-21 | End: 2022-01-03

## 2021-10-21 NOTE — PROGRESS NOTES
Chief Complaint   Patient presents with    Sinus Infection     drain with throat irritation    Cough     1. Have you been to the ER, urgent care clinic since your last visit? Hospitalized since your last visit? no    2. Have you seen or consulted any other health care providers outside of the [address] [place] [address] since your last visit? No  Abuse Screening Questionnaire 10/21/2021   Do you ever feel afraid of your partner? N   Are you in a relationship with someone who physically or mentally threatens you? N   Is it safe for you to go home?  Y     3 most recent PHQ Screens 10/21/2021   Little interest or pleasure in doing things Not at all   Feeling down, depressed, irritable, or hopeless Not at all   Total Score PHQ 2 0

## 2021-10-21 NOTE — TELEPHONE ENCOUNTER
Patient has had a sore throat since sat. Today is getting up some yellow mucus. VV or OTC meds? Need approval to add to schedule today.

## 2021-10-22 NOTE — PROGRESS NOTES
Stiven Corea is a 35 y.o. female, evaluated via audio-only technology on 10/21/2021 for Sinus Infection (drain with throat irritation) and Cough  for a few days. She denies fever, body aches or chills. Taking mucinex she endorses sore throat ,facial pain, nagging cough and drainage in the back of her throat. Assessment & Plan:   Diagnoses and all orders for this visit:    1. Sore throat  -     azithromycin (ZITHROMAX) 250 mg tablet; Take 2 tablets today, then take 1 tablet daily  -     methylPREDNISolone (MedroL) 4 mg tab; Take 5 tablets day one, take 4 tablets day two, take 3 tablets day three,take 2 tablets day four, take 1 tablet day five. 2. Acute non-recurrent maxillary sinusitis  -     azithromycin (ZITHROMAX) 250 mg tablet; Take 2 tablets today, then take 1 tablet daily  -     methylPREDNISolone (MedroL) 4 mg tab; Take 5 tablets day one, take 4 tablets day two, take 3 tablets day three,take 2 tablets day four, take 1 tablet day five. The complexity of medical decision making for this visit is moderate         12  Subjective:       Prior to Admission medications    Medication Sig Start Date End Date Taking? Authorizing Provider   azithromycin (ZITHROMAX) 250 mg tablet Take 2 tablets today, then take 1 tablet daily 10/21/21  Yes Adri Mcconnell NP   methylPREDNISolone (MedroL) 4 mg tab Take 5 tablets day one, take 4 tablets day two, take 3 tablets day three,take 2 tablets day four, take 1 tablet day five. 10/21/21  Yes Adri Mcconnell NP   hydroCHLOROthiazide (HYDRODIURIL) 12.5 mg tablet Take 1 Tablet by mouth daily.  6/3/21  Yes Ken Cochran NP   norgestimate-ethinyl estradioL [place]) 0.25-35 mg-mcg tab Estarylla 0.25 mg-35 mcg tablet   1 tablet by mouth everyday   Yes Provider, Historical   triamcinolone acetonide (KENALOG) 0.1 % topical cream APPLY A VERY SMALL AMOUNT TOPICALLY TO ACTIVE PATCHES ON LOWER LEGS TWICE DAILY AS NEEDED FOR FLARES  Patient not taking: Reported on 10/21/2021 3/26/21   Provider, Historical   omeprazole (PRILOSEC) 40 mg capsule Take 1 Cap by mouth daily. Patient not taking: Reported on 10/21/2021 4/9/21   Nenita Page NP   metFORMIN ER (GLUCOPHAGE XR) 500 mg tablet Take 1 Tab by mouth two (2) times a day. Patient not taking: Reported on 10/21/2021 4/9/21   Nenita Page NP     Patient Active Problem List   Diagnosis Code    Extreme insulin resistance type B E88.81    Vitamin D deficiency E55.9    Stress incontinence N39.3    Snoring R06.83    Morbid obesity ([facility] [address] E66.01    Family history of malignant neoplasm of breast Z80.3    Dyslipidemia E78.5     Patient Active Problem List    Diagnosis Date Noted    Dyslipidemia 04/13/2021    Extreme insulin resistance type B 02/15/2020    Vitamin D deficiency 02/15/2020    Snoring 01/29/2020    Family history of malignant neoplasm of breast 02/07/2017    Morbid obesity ([facility] [address] 06/10/2015    Stress incontinence 02/26/2014     Current Outpatient Medications   Medication Sig Dispense Refill    azithromycin (ZITHROMAX) 250 mg tablet Take 2 tablets today, then take 1 tablet daily 6 Tablet 0    methylPREDNISolone (MedroL) 4 mg tab Take 5 tablets day one, take 4 tablets day two, take 3 tablets day three,take 2 tablets day four, take 1 tablet day five. 15 Tablet 0    hydroCHLOROthiazide (HYDRODIURIL) 12.5 mg tablet Take 1 Tablet by mouth daily. 90 Tablet 3    norgestimate-ethinyl estradioL (Estarylla) 0.25-35 mg-mcg tab Estarylla 0.25 mg-35 mcg tablet   1 tablet by mouth everyday      triamcinolone acetonide (KENALOG) 0.1 % topical cream APPLY A VERY SMALL AMOUNT TOPICALLY TO ACTIVE PATCHES ON LOWER LEGS TWICE DAILY AS NEEDED FOR FLARES (Patient not taking: Reported on 10/21/2021)      omeprazole (PRILOSEC) 40 mg capsule Take 1 Cap by mouth daily. (Patient not taking: Reported on 10/21/2021) 30 Cap 1    metFORMIN ER (GLUCOPHAGE XR) 500 mg tablet Take 1 Tab by mouth two (2) times a day. (Patient not taking: Reported on 10/21/2021) 60 Tab 5     No Known Allergies  Past Medical History:   Diagnosis Date    Dyslipidemia 4/13/2021     Past Surgical History:   Procedure Laterality Date    HX LITHOTRIPSY  2016     Family History   Problem Relation Age of Onset    Cancer Mother     Hypertension Father      Social History     Tobacco Use    Smoking status: Never Smoker    Smokeless tobacco: Never Used   Substance Use Topics    Alcohol use: Yes       ROS  Pertinent items are noted on the HPI  Patient-Reported Vitals 10/21/2021   Patient-Reported LMP 10--       Antonio Muir, who was evaluated through a patient-initiated, synchronous (real-time) audio only encounter, and/or her healthcare decision maker, is aware that it is a billable service, with coverage as determined by her insurance carrier. She provided verbal consent to proceed: Yes. She has not had a related appointment within my department in the past 7 days or scheduled within the next 24 hours. On this date 10/21/2021 I have spent 15 minutes reviewing previous notes, test results and face to face (virtual) with the patient discussing the diagnosis and importance of compliance with the treatment plan as well as documenting on the day of the visit.     Nick Manriquez NP

## 2022-01-03 RX ORDER — FLUCONAZOLE 150 MG/1
150 TABLET ORAL DAILY
Qty: 1 TABLET | Refills: 0 | Status: SHIPPED | OUTPATIENT
Start: 2022-01-03 | End: 2022-01-04

## 2022-01-14 ENCOUNTER — VIRTUAL VISIT (OUTPATIENT)
Dept: FAMILY MEDICINE CLINIC | Age: 42
End: 2022-01-14
Payer: COMMERCIAL

## 2022-01-14 DIAGNOSIS — J06.9 VIRAL UPPER RESPIRATORY ILLNESS: Primary | ICD-10-CM

## 2022-01-14 PROCEDURE — 99441 PR PHYS/QHP TELEPHONE EVALUATION 5-10 MIN: CPT | Performed by: NURSE PRACTITIONER

## 2022-01-14 RX ORDER — METHYLPREDNISOLONE 4 MG/1
TABLET ORAL
Qty: 1 DOSE PACK | Refills: 0 | Status: SHIPPED | OUTPATIENT
Start: 2022-01-14 | End: 2022-03-14 | Stop reason: ALTCHOICE

## 2022-01-14 NOTE — PROGRESS NOTES
Pt reports a 4 day history of cough, headache, sore throat, nasal congestion. She reports that her cough has progressed to productive with yellow mucus. She denies fever and known covid exposure. She has been treating over the counter with Leona braxtoner (day & night) and Cold & Flu medicine as well. Feels like she is getting worse and not improving.  KT

## 2022-01-14 NOTE — PROGRESS NOTES
Romayne Russian is a 35 y.o. female evaluated via telephone on 1/14/2022. Consent:    She and/or health care decision maker is aware that that she may receive a bill for this telephone service, depending on her insurance coverage, and has provided verbal consent to proceed: Yes      Documentation:  I communicated with the patient and/or health care decision maker about cough and congestion since Tuesday. She reports a productive cough with yellow mucus, sinus congestion, malaise, headache. Afebrile. No sick contacts. Details of this discussion including any medical advice provided: Discussed symptoms consistent with COVID or other respiratory illness. Reviewed infection control measures, isolation period. Rx Medrol dose pack. Increase fluids, rest, use OTC Tylenol and/or Motrin as directed on package insert for aches/fever. May use OTC antihistamines/decongestants as directed. Call if no improvement in 5-7 days. I affirm this is a Patient Initiated Episode with an Established Patient who has not had a related appointment within my department in the past 7 days or scheduled within the next 24 hours. ASSESSMENT AND PLAN:       ICD-10-CM ICD-9-CM    1. Viral upper respiratory illness  J06.9 465.9          Patient aware of plan of care and verbalized understanding. Questions answered. RTC PRN.     Jodee Cruz NP    Total Time: minutes: 5-10 minutes    Note: not billable if this call serves to triage the patient into an appointment for the relevant concern      Jodee Cruz NP

## 2022-01-14 NOTE — PATIENT INSTRUCTIONS
Upper Respiratory Infection (Cold): Care Instructions  Your Care Instructions     An upper respiratory infection, or URI, is an infection of the nose, sinuses, or throat. URIs are spread by coughs, sneezes, and direct contact. The common cold is the most frequent kind of URI. The flu and sinus infections are other kinds of URIs. Almost all URIs are caused by viruses. Antibiotics won't cure them. But you can treat most infections with home care. This may include drinking lots of fluids and taking over-the-counter pain medicine. You will probably feel better in 4 to 10 days. The doctor has checked you carefully, but problems can develop later. If you notice any problems or new symptoms, get medical treatment right away. Follow-up care is a key part of your treatment and safety. Be sure to make and go to all appointments, and call your doctor if you are having problems. It's also a good idea to know your test results and keep a list of the medicines you take. How can you care for yourself at home? · To prevent dehydration, drink plenty of fluids. Choose water and other clear liquids until you feel better. If you have kidney, heart, or liver disease and have to limit fluids, talk with your doctor before you increase the amount of fluids you drink. · Take an over-the-counter pain medicine, such as acetaminophen (Tylenol), ibuprofen (Advil, Motrin), or naproxen (Aleve). Read and follow all instructions on the label. · Before you use cough and cold medicines, check the label. These medicines may not be safe for young children or for people with certain health problems. · Be careful when taking over-the-counter cold or flu medicines and Tylenol at the same time. Many of these medicines have acetaminophen, which is Tylenol. Read the labels to make sure that you are not taking more than the recommended dose. Too much acetaminophen (Tylenol) can be harmful.   · Get plenty of rest.  · Do not smoke or allow others to smoke around you. If you need help quitting, talk to your doctor about stop-smoking programs and medicines. These can increase your chances of quitting for good. When should you call for help? Call 911 anytime you think you may need emergency care. For example, call if:    · You have severe trouble breathing. Call your doctor now or seek immediate medical care if:    · You seem to be getting much sicker.     · You have new or worse trouble breathing.     · You have a new or higher fever.     · You have a new rash. Watch closely for changes in your health, and be sure to contact your doctor if:    · You have a new symptom, such as a sore throat, an earache, or sinus pain.     · You cough more deeply or more often, especially if you notice more mucus or a change in the color of your mucus.     · You do not get better as expected. Where can you learn more? Go to http://www.gray.com/  Enter K520 in the search box to learn more about \"Upper Respiratory Infection (Cold): Care Instructions. \"  Current as of: July 6, 2021               Content Version: 13.0  © 2006-2021 Healthwise, Incorporated. Care instructions adapted under license by [employer] (which disclaims liability or warranty for this information). If you have questions about a medical condition or this instruction, always ask your healthcare professional. [address] any warranty or liability for your use of this information.

## 2022-03-14 ENCOUNTER — VIRTUAL VISIT (OUTPATIENT)
Dept: FAMILY MEDICINE CLINIC | Age: 42
End: 2022-03-14
Payer: COMMERCIAL

## 2022-03-14 DIAGNOSIS — J01.00 ACUTE NON-RECURRENT MAXILLARY SINUSITIS: ICD-10-CM

## 2022-03-14 DIAGNOSIS — J02.9 SORE THROAT: ICD-10-CM

## 2022-03-14 PROCEDURE — 99442 PR PHYS/QHP TELEPHONE EVALUATION 11-20 MIN: CPT | Performed by: NURSE PRACTITIONER

## 2022-03-14 RX ORDER — AZITHROMYCIN 250 MG/1
TABLET, FILM COATED ORAL
Qty: 6 TABLET | Refills: 0 | Status: SHIPPED | OUTPATIENT
Start: 2022-03-14

## 2022-03-14 RX ORDER — METHYLPREDNISOLONE 4 MG/1
TABLET ORAL
Qty: 15 TABLET | Refills: 0 | Status: SHIPPED | OUTPATIENT
Start: 2022-03-14

## 2022-03-14 NOTE — PROGRESS NOTES
Domingo Lee is a 35 y.o. female, evaluated via audio-only technology on 3/14/2022 for Sinus Infection (with cough)  . She endorses feeling ill since Thursday- 5 days ago. Sinus pressure, sore throat and dry cough. Her son was diagnosed with sinusitis last week and her daughter was diagnosed and treated for strep throat. She has tried OTC Leona Charlotte cold and head congestion with no relief. Assessment & Plan:   Diagnoses and all orders for this visit:    1. Sore throat  -     azithromycin (ZITHROMAX) 250 mg tablet; Take 2 tablets today, then take 1 tablet daily  -     methylPREDNISolone (MedroL) 4 mg tab; Take 5 tablets day one, take 4 tablets day two, take 3 tablets day three,take 2 tablets day four, take 1 tablet day five. 2. Acute non-recurrent maxillary sinusitis  -     azithromycin (ZITHROMAX) 250 mg tablet; Take 2 tablets today, then take 1 tablet daily  -     methylPREDNISolone (MedroL) 4 mg tab; Take 5 tablets day one, take 4 tablets day two, take 3 tablets day three,take 2 tablets day four, take 1 tablet day five. The complexity of medical decision making for this visit is moderate         12  Subjective:       Prior to Admission medications    Medication Sig Start Date End Date Taking? Authorizing Provider   azithromycin (ZITHROMAX) 250 mg tablet Take 2 tablets today, then take 1 tablet daily 3/14/22  Yes Jaylene Whiting NP   methylPREDNISolone (MedroL) 4 mg tab Take 5 tablets day one, take 4 tablets day two, take 3 tablets day three,take 2 tablets day four, take 1 tablet day five. 3/14/22  Yes Jaylene Whiting NP   hydroCHLOROthiazide (HYDRODIURIL) 12.5 mg tablet Take 1 Tablet by mouth daily. 6/3/21  Yes Chavez Colunga NP   norgestimate-ethinyl estradioL Fayrene Showman) 0.25-35 mg-mcg tab Estarylla 0.25 mg-35 mcg tablet   1 tablet by mouth everyday   Yes Provider, Historical   methylPREDNISolone (MEDROL DOSEPACK) 4 mg tablet Take as directed.   Patient not taking: Reported on 3/14/2022 1/14/22 3/14/22  Dequan Saenz NP   triamcinolone acetonide (KENALOG) 0.1 % topical cream APPLY A VERY SMALL AMOUNT TOPICALLY TO ACTIVE PATCHES ON LOWER LEGS TWICE DAILY AS NEEDED FOR FLARES  Patient not taking: Reported on 10/21/2021 3/26/21   Provider, Historical   omeprazole (PRILOSEC) 40 mg capsule Take 1 Cap by mouth daily. Patient not taking: Reported on 10/21/2021 4/9/21   Dequan Saenz NP   metFORMIN ER (GLUCOPHAGE XR) 500 mg tablet Take 1 Tab by mouth two (2) times a day. Patient not taking: Reported on 10/21/2021 4/9/21   Dequan Saenz NP     Patient Active Problem List   Diagnosis Code    Extreme insulin resistance type B E88.81    Vitamin D deficiency E55.9    Stress incontinence N39.3    Snoring R06.83    Morbid obesity ([facility] [address] E66.01    Family history of malignant neoplasm of breast Z80.3    Dyslipidemia E78.5     Patient Active Problem List    Diagnosis Date Noted    Dyslipidemia 04/13/2021    Extreme insulin resistance type B 02/15/2020    Vitamin D deficiency 02/15/2020    Snoring 01/29/2020    Family history of malignant neoplasm of breast 02/07/2017    Morbid obesity ([facility] [address] 06/10/2015    Stress incontinence 02/26/2014     Current Outpatient Medications   Medication Sig Dispense Refill    azithromycin (ZITHROMAX) 250 mg tablet Take 2 tablets today, then take 1 tablet daily 6 Tablet 0    methylPREDNISolone (MedroL) 4 mg tab Take 5 tablets day one, take 4 tablets day two, take 3 tablets day three,take 2 tablets day four, take 1 tablet day five. 15 Tablet 0    hydroCHLOROthiazide (HYDRODIURIL) 12.5 mg tablet Take 1 Tablet by mouth daily.  90 Tablet 3    norgestimate-ethinyl estradioL (Estarylla) 0.25-35 mg-mcg tab Estarylla 0.25 mg-35 mcg tablet   1 tablet by mouth everyday      triamcinolone acetonide (KENALOG) 0.1 % topical cream APPLY A VERY SMALL AMOUNT TOPICALLY TO ACTIVE PATCHES ON LOWER LEGS TWICE DAILY AS NEEDED FOR FLARES (Patient not taking: Reported on 10/21/2021)      omeprazole (PRILOSEC) 40 mg capsule Take 1 Cap by mouth daily. (Patient not taking: Reported on 10/21/2021) 30 Cap 1    metFORMIN ER (GLUCOPHAGE XR) 500 mg tablet Take 1 Tab by mouth two (2) times a day. (Patient not taking: Reported on 10/21/2021) 60 Tab 5     No Known Allergies  Past Medical History:   Diagnosis Date    Dyslipidemia 4/13/2021     Past Surgical History:   Procedure Laterality Date    HX CHOLECYSTECTOMY  12/2021    HX LITHOTRIPSY  2016     Family History   Problem Relation Age of Onset    Cancer Mother     Hypertension Father      Social History     Tobacco Use    Smoking status: Never Smoker    Smokeless tobacco: Never Used   Substance Use Topics    Alcohol use: Yes       ROS  Pertinent items are noted in the HPI. Patient-Reported LMP: on period now       Domingo Lee was evaluated through a patient-initiated, synchronous (real-time) audio only encounter. She (or guardian if applicable) is aware that it is a billable service, which includes applicable co-pays, with coverage as determined by her insurance carrier. This visit was conducted with the patient's (and/or Estefania Berrios guardian's) verbal consent. She has not had a related appointment within my department in the past 7 days or scheduled within the next 24 hours. The patient was located in a state where the provider was licensed to provide care.     Total Time: minutes: 11-20 minutes    Damian Gaitan NP

## 2022-03-14 NOTE — PROGRESS NOTES
Chief Complaint   Patient presents with    Sinus Infection     with cough     Abuse Screening Questionnaire 3/14/2022   Do you ever feel afraid of your partner? N   Are you in a relationship with someone who physically or mentally threatens you? N   Is it safe for you to go home? Y     1. Have you been to the ER, urgent care clinic since your last visit? Hospitalized since your last visit? Yes [facility] [place] [facility]- for cholecystomy      2. Have you seen or consulted any other health care providers outside of the [address] [place] [address] since your last visit? Include any pap smears or colon screening. no  Abuse Screening Questionnaire 3/14/2022   Do you ever feel afraid of your partner? N   Are you in a relationship with someone who physically or mentally threatens you? N   Is it safe for you to go home?  Y     Learning Assessment 4/30/2021   PRIMARY LEARNER Patient   HIGHEST LEVEL OF EDUCATION - PRIMARY LEARNER  GRADUATED HIGH SCHOOL OR GED   BARRIERS PRIMARY LEARNER NONE   CO-LEARNER CAREGIVER No   PRIMARY LANGUAGE ENGLISH   LEARNER PREFERENCE PRIMARY READING   ANSWERED BY Patient   RELATIONSHIP SELF

## 2022-03-18 PROBLEM — Z80.3 FAMILY HISTORY OF MALIGNANT NEOPLASM OF BREAST: Status: ACTIVE | Noted: 2017-02-07

## 2022-03-18 PROBLEM — R06.83 SNORING: Status: ACTIVE | Noted: 2020-01-29

## 2022-03-18 PROBLEM — E55.9 VITAMIN D DEFICIENCY: Status: ACTIVE | Noted: 2020-02-15

## 2022-03-18 PROBLEM — E88.818 EXTREME INSULIN RESISTANCE TYPE B: Status: ACTIVE | Noted: 2020-02-15

## 2022-03-18 PROBLEM — E88.81 EXTREME INSULIN RESISTANCE TYPE B: Status: ACTIVE | Noted: 2020-02-15

## 2022-03-19 PROBLEM — E78.5 DYSLIPIDEMIA: Status: ACTIVE | Noted: 2021-04-13

## 2022-06-12 ENCOUNTER — APPOINTMENT (OUTPATIENT)
Dept: ULTRASOUND IMAGING | Age: 42
End: 2022-06-12
Attending: EMERGENCY MEDICINE
Payer: COMMERCIAL

## 2022-06-12 ENCOUNTER — HOSPITAL ENCOUNTER (EMERGENCY)
Age: 42
Discharge: HOME | End: 2022-06-12
Attending: EMERGENCY MEDICINE
Payer: COMMERCIAL

## 2022-06-12 VITALS
WEIGHT: 285.27 LBS | SYSTOLIC BLOOD PRESSURE: 126 MMHG | OXYGEN SATURATION: 99 % | RESPIRATION RATE: 18 BRPM | BODY MASS INDEX: 47.53 KG/M2 | HEIGHT: 65 IN | HEART RATE: 70 BPM | TEMPERATURE: 97.7 F | DIASTOLIC BLOOD PRESSURE: 74 MMHG

## 2022-06-12 DIAGNOSIS — N93.9 VAGINAL BLEEDING: Primary | ICD-10-CM

## 2022-06-12 DIAGNOSIS — Z34.90 PREGNANCY, UNSPECIFIED GESTATIONAL AGE: ICD-10-CM

## 2022-06-12 LAB
ABO + RH BLD: NORMAL
ANION GAP SERPL CALC-SCNC: 6 MMOL/L (ref 5–15)
APPEARANCE UR: ABNORMAL
BACTERIA URNS QL MICRO: ABNORMAL /HPF
BASOPHILS # BLD: 0.1 K/UL (ref 0–0.1)
BASOPHILS NFR BLD: 1 % (ref 0–1)
BILIRUB UR QL: NEGATIVE
BLOOD BANK CMNT PATIENT-IMP: NORMAL
BUN SERPL-MCNC: 9 MG/DL (ref 6–20)
BUN/CREAT SERPL: 15 (ref 12–20)
CALCIUM SERPL-MCNC: 8.9 MG/DL (ref 8.5–10.1)
CHLORIDE SERPL-SCNC: 106 MMOL/L (ref 97–108)
CO2 SERPL-SCNC: 26 MMOL/L (ref 21–32)
COLOR UR: ABNORMAL
CREAT SERPL-MCNC: 0.6 MG/DL (ref 0.55–1.02)
DIFFERENTIAL METHOD BLD: NORMAL
EOSINOPHIL # BLD: 0.1 K/UL (ref 0–0.4)
EOSINOPHIL NFR BLD: 1 % (ref 0–7)
EPITH CASTS URNS QL MICRO: ABNORMAL /LPF
ERYTHROCYTE [DISTWIDTH] IN BLOOD BY AUTOMATED COUNT: 13.2 % (ref 11.5–14.5)
GLUCOSE SERPL-MCNC: 82 MG/DL (ref 65–100)
GLUCOSE UR STRIP.AUTO-MCNC: NEGATIVE MG/DL
HCG SERPL-ACNC: ABNORMAL MIU/ML (ref 0–6)
HCT VFR BLD AUTO: 41.2 % (ref 35–47)
HGB BLD-MCNC: 14 G/DL (ref 11.5–16)
HGB UR QL STRIP: ABNORMAL
IMM GRANULOCYTES # BLD AUTO: 0 K/UL (ref 0–0.04)
IMM GRANULOCYTES NFR BLD AUTO: 0 % (ref 0–0.5)
KETONES UR QL STRIP.AUTO: NEGATIVE MG/DL
LEUKOCYTE ESTERASE UR QL STRIP.AUTO: NEGATIVE
LYMPHOCYTES # BLD: 2.9 K/UL (ref 0.8–3.5)
LYMPHOCYTES NFR BLD: 36 % (ref 12–49)
MCH RBC QN AUTO: 30.7 PG (ref 26–34)
MCHC RBC AUTO-ENTMCNC: 34 G/DL (ref 30–36.5)
MCV RBC AUTO: 90.4 FL (ref 80–99)
MONOCYTES # BLD: 0.7 K/UL (ref 0–1)
MONOCYTES NFR BLD: 8 % (ref 5–13)
NEUTS SEG # BLD: 4.2 K/UL (ref 1.8–8)
NEUTS SEG NFR BLD: 54 % (ref 32–75)
NITRITE UR QL STRIP.AUTO: NEGATIVE
NRBC # BLD: 0 K/UL (ref 0–0.01)
NRBC BLD-RTO: 0 PER 100 WBC
PH UR STRIP: 6 [PH] (ref 5–8)
PLATELET # BLD AUTO: 246 K/UL (ref 150–400)
PMV BLD AUTO: 9.6 FL (ref 8.9–12.9)
POTASSIUM SERPL-SCNC: 3.8 MMOL/L (ref 3.5–5.1)
PROT UR STRIP-MCNC: 100 MG/DL
RBC # BLD AUTO: 4.56 M/UL (ref 3.8–5.2)
RBC #/AREA URNS HPF: >100 /HPF (ref 0–5)
SODIUM SERPL-SCNC: 138 MMOL/L (ref 136–145)
SP GR UR REFRACTOMETRY: 1.02 (ref 1–1.03)
UA: UC IF INDICATED,UAUC: ABNORMAL
UROBILINOGEN UR QL STRIP.AUTO: 1 EU/DL (ref 0.2–1)
WBC # BLD AUTO: 8 K/UL (ref 3.6–11)
WBC URNS QL MICRO: ABNORMAL /HPF (ref 0–4)

## 2022-06-12 PROCEDURE — 81001 URINALYSIS AUTO W/SCOPE: CPT

## 2022-06-12 PROCEDURE — 76817 TRANSVAGINAL US OBSTETRIC: CPT

## 2022-06-12 PROCEDURE — 99284 EMERGENCY DEPT VISIT MOD MDM: CPT

## 2022-06-12 PROCEDURE — 86900 BLOOD TYPING SEROLOGIC ABO: CPT

## 2022-06-12 PROCEDURE — 85025 COMPLETE CBC W/AUTO DIFF WBC: CPT

## 2022-06-12 PROCEDURE — 80048 BASIC METABOLIC PNL TOTAL CA: CPT

## 2022-06-12 PROCEDURE — 84702 CHORIONIC GONADOTROPIN TEST: CPT

## 2022-06-12 PROCEDURE — 36415 COLL VENOUS BLD VENIPUNCTURE: CPT

## 2022-06-13 LAB
ANTIBODY SCREEN, EXTERNAL: NEGATIVE
TYPE, ABO & RH, EXTERNAL: NORMAL

## 2022-06-13 NOTE — DISCHARGE INSTRUCTIONS
Return emergency department if you develop worsening abdominal pain, worsening vaginal bleeding, passing out or for any other concerning symptoms

## 2022-06-13 NOTE — ED PROVIDER NOTES
EMERGENCY DEPARTMENT HISTORY AND PHYSICAL EXAM      Date: 6/12/2022  Patient Name: Carlos Cerna  Patient Age and Sex: 35 y.o. female     History of Presenting Illness     Chief Complaint   Patient presents with    Vaginal Bleeding     Reports she is 2 months pregnant, had an episode of moderate vaginal bleeding about an hour ago. She is concerned for possible miscarrige. Denied hx of miscarriges. History Provided By: Patient    HPI: Carlos Cerna is a 40-year-old approximately 8 weeks pregnant by last menstrual period (4/15) presenting with abdominal pain and vaginal bleeding. She reports that about 5 PM this evening she developed abrupt onset vaginal bleeding as well as lower abdominal cramping. Symptoms have been persistent, moderate in severity 5 out of 10 intermittent cramping in her suprapubic region. She soaked about 2 pads since it started 4 hours ago. Bleeding has improved. She has passed 2 small clots approximately nickel size, no passage of tissue. No history ectopic no history of PID no history of fertility treatments. This is her third pregnancy. There are no other complaints, changes, or physical findings at this time. PCP: Jami Cunningham NP    No current facility-administered medications on file prior to encounter. Current Outpatient Medications on File Prior to Encounter   Medication Sig Dispense Refill    azithromycin (ZITHROMAX) 250 mg tablet Take 2 tablets today, then take 1 tablet daily 6 Tablet 0    methylPREDNISolone (MedroL) 4 mg tab Take 5 tablets day one, take 4 tablets day two, take 3 tablets day three,take 2 tablets day four, take 1 tablet day five. 15 Tablet 0    hydroCHLOROthiazide (HYDRODIURIL) 12.5 mg tablet Take 1 Tablet by mouth daily.  90 Tablet 3    norgestimate-ethinyl estradioL (Estarylla) 0.25-35 mg-mcg tab Estarylla 0.25 mg-35 mcg tablet   1 tablet by mouth everyday      triamcinolone acetonide (KENALOG) 0.1 % topical cream APPLY A VERY SMALL AMOUNT TOPICALLY TO ACTIVE PATCHES ON LOWER LEGS TWICE DAILY AS NEEDED FOR FLARES (Patient not taking: Reported on 10/21/2021)      omeprazole (PRILOSEC) 40 mg capsule Take 1 Cap by mouth daily. (Patient not taking: Reported on 10/21/2021) 30 Cap 1    metFORMIN ER (GLUCOPHAGE XR) 500 mg tablet Take 1 Tab by mouth two (2) times a day. (Patient not taking: Reported on 10/21/2021) 60 Tab 5       Past History     Past Medical History:  Past Medical History:   Diagnosis Date    Dyslipidemia 4/13/2021       Past Surgical History:  Past Surgical History:   Procedure Laterality Date    HX CHOLECYSTECTOMY  12/2021    HX LITHOTRIPSY  2016       Family History:  Family History   Problem Relation Age of Onset    Cancer Mother     Hypertension Father        Social History:  Social History     Tobacco Use    Smoking status: Never Smoker    Smokeless tobacco: Never Used   Vaping Use    Vaping Use: Never used   Substance Use Topics    Alcohol use: Yes    Drug use: Never       Allergies:  No Known Allergies      Review of Systems   Review of Systems   Constitutional: Negative for chills and fever. HENT: Negative for congestion and rhinorrhea. Respiratory: Negative for shortness of breath. Cardiovascular: Negative for chest pain. Gastrointestinal: Positive for abdominal pain. Negative for nausea and vomiting. Genitourinary: Positive for vaginal bleeding. Negative for dysuria and frequency. Musculoskeletal: Negative for myalgias. All other systems reviewed and are negative. Physical Exam   Physical Exam  Vitals and nursing note reviewed. Constitutional:       General: She is not in acute distress. Appearance: Normal appearance. She is not ill-appearing. HENT:      Head: Normocephalic. Mouth/Throat:      Mouth: Mucous membranes are moist.   Eyes:      Conjunctiva/sclera: Conjunctivae normal.   Cardiovascular:      Rate and Rhythm: Normal rate and regular rhythm. Pulses: Normal pulses. Pulmonary:      Effort: Pulmonary effort is normal.      Breath sounds: Normal breath sounds. Abdominal:      General: Abdomen is flat. Palpations: Abdomen is soft. Comments: Mild suprapubic tenderness to palpation, no guarding no rigidity   Musculoskeletal:         General: No deformity. Skin:     General: Skin is warm and dry. Neurological:      Mental Status: She is alert and oriented to person, place, and time. Mental status is at baseline. Psychiatric:         Behavior: Behavior normal.         Thought Content: Thought content normal.        Diagnostic Study Results     Labs -     Recent Results (from the past 12 hour(s))   URINALYSIS W/ REFLEX CULTURE    Collection Time: 06/12/22  7:41 PM    Specimen: Urine   Result Value Ref Range    Color RED      Appearance CLOUDY (A) CLEAR      Specific gravity 1.025 1.003 - 1.030      pH (UA) 6.0 5.0 - 8.0      Protein 100 (A) NEG mg/dL    Glucose Negative NEG mg/dL    Ketone Negative NEG mg/dL    Bilirubin Negative NEG      Blood LARGE (A) NEG      Urobilinogen 1.0 0.2 - 1.0 EU/dL    Nitrites Negative NEG      Leukocyte Esterase Negative NEG      WBC 0-4 0 - 4 /hpf    RBC >100 (H) 0 - 5 /hpf    Epithelial cells FEW FEW /lpf    Bacteria 1+ (A) NEG /hpf    UA:UC IF INDICATED CULTURE NOT INDICATED BY UA RESULT CNI     CBC WITH AUTOMATED DIFF    Collection Time: 06/12/22  7:47 PM   Result Value Ref Range    WBC 8.0 3.6 - 11.0 K/uL    RBC 4.56 3.80 - 5.20 M/uL    HGB 14.0 11.5 - 16.0 g/dL    HCT 41.2 35.0 - 47.0 %    MCV 90.4 80.0 - 99.0 FL    MCH 30.7 26.0 - 34.0 PG    MCHC 34.0 30.0 - 36.5 g/dL    RDW 13.2 11.5 - 14.5 %    PLATELET 791 635 - 984 K/uL    MPV 9.6 8.9 - 12.9 FL    NRBC 0.0 0  WBC    ABSOLUTE NRBC 0.00 0.00 - 0.01 K/uL    NEUTROPHILS 54 32 - 75 %    LYMPHOCYTES 36 12 - 49 %    MONOCYTES 8 5 - 13 %    EOSINOPHILS 1 0 - 7 %    BASOPHILS 1 0 - 1 %    IMMATURE GRANULOCYTES 0 0.0 - 0.5 %    ABS. NEUTROPHILS 4.2 1.8 - 8.0 K/UL    ABS. LYMPHOCYTES 2.9 0.8 - 3.5 K/UL    ABS. MONOCYTES 0.7 0.0 - 1.0 K/UL    ABS. EOSINOPHILS 0.1 0.0 - 0.4 K/UL    ABS. BASOPHILS 0.1 0.0 - 0.1 K/UL    ABS. IMM. GRANS. 0.0 0.00 - 0.04 K/UL    DF AUTOMATED     METABOLIC PANEL, BASIC    Collection Time: 06/12/22  7:47 PM   Result Value Ref Range    Sodium 138 136 - 145 mmol/L    Potassium 3.8 3.5 - 5.1 mmol/L    Chloride 106 97 - 108 mmol/L    CO2 26 21 - 32 mmol/L    Anion gap 6 5 - 15 mmol/L    Glucose 82 65 - 100 mg/dL    BUN 9 6 - 20 MG/DL    Creatinine 0.60 0.55 - 1.02 MG/DL    BUN/Creatinine ratio 15 12 - 20      GFR est AA >60 >60 ml/min/1.73m2    GFR est non-AA >60 >60 ml/min/1.73m2    Calcium 8.9 8.5 - 10.1 MG/DL   BETA HCG, QT    Collection Time: 06/12/22  7:47 PM   Result Value Ref Range    Beta HCG, QT 47,822 (H) 0 - 6 MIU/ML   BLOOD TYPE, (ABO+RH)    Collection Time: 06/12/22  7:47 PM   Result Value Ref Range    ABO/Rh(D) A POSITIVE     Comment SAMPLE NOT USABLE FOR CROSSMATCH        Radiologic Studies -   US UTS TRANSVAGINAL OB   Final Result   A fluid collection is seen within the uterus. A fetal pole is not   seen. Differential diagnostic possibilities include anembryonic pregnancy and   severe gestational sac of ectopic pregnancy and therefore serial beta hCGs and   possible follow-up ultrasound recommended. CT Results  (Last 48 hours)    None        CXR Results  (Last 48 hours)    None          Medical Decision Making   I am the first provider for this patient. I reviewed the vital signs, available nursing notes, past medical history, past surgical history, family history and social history. Vital Signs-Reviewed the patient's vital signs.   Patient Vitals for the past 12 hrs:   Temp Pulse Resp BP SpO2   06/12/22 2201 -- -- -- -- 99 %   06/12/22 2200 -- -- -- 126/74 --   06/12/22 1832 97.7 °F (36.5 °C) 70 18 (!) 143/89 100 %       Records Reviewed: Nursing Notes and Old Medical Records    Provider Notes (Medical Decision Making):   Dino ectopic, miscarriage, bleeding in first trimester    Blood work and imaging obtained prior to my evaluation including beta-hCG, transvaginal ultrasound, CBC CMP. Beta quant is elevated 47,000 with no definitive IUP on ultrasound concerning for ectopic pregnancy. 2.1 cm fluid collection which correlates to 6 weeks with identified. ED Course:   Initial assessment performed. The patients presenting problems have been discussed, and they are in agreement with the care plan formulated and outlined with them. I have encouraged them to ask questions as they arise throughout their visit. ED Course as of 06/12/22 2237   Carlos Enrique Lynch Jun 12, 2022 2120 I did call OB hospitalist, Dr Michele Jefferson to discuss posibility of ectopic vs anembryonic pregnancy. Given her reassuring abdomen, lack of free fluid, high risk of miscarriage, this is likely an antibiotic pregnancy and an early miscarriage. We will have patient follow-up closely within 48 hours for ultrasound and repeat lab work give strict return precautions for any worsening abdominal pain or worsening vaginal bleeding. Dr Michele Jefferson will speak with on call [facility] provider to facilitate close outpatient follow upw [WB]   [phone] I did perform bedside ultrasound which demonstrates no perisplenic fluid, no fluid in Morison's pouch, no fluid in pelvis from abdominal views. [WB]      ED Course User Index  [WB] Stephan Gomez MD     Critical Care Time:   0    Disposition:  Discharge Note:  The patient has been re-evaluated and is ready for discharge. Reviewed available results with patient. Counseled patient on diagnosis and care plan. Patient has expressed understanding, and all questions have been answered. Patient agrees with plan and agrees to follow up as recommended, or to return to the ED if their symptoms worsen. Discharge instructions have been provided and explained to the patient, along with reasons to return to the ED.       PLAN:  Current Discharge Medication List        2. Follow-up Information     Follow up With Specialties Details Why [address]   Please call [facility] and set up appointment for repeat blood draw, repeat ultrasound within 48 hours from today [address]  [address] MD Obstetrics & Gynecology, Obstetrics & Gynecology, Gynecology, Obstetrics   [address] [address]  [phone]      [facility] EMERGENCY DEPT Emergency Medicine  If you are unable to schedule appointment for these tests within that period of time, please return emergency department for this test, As needed, If symptoms worsen [address]  [address]  [phone]        3. Return to ED if worse     Diagnosis     Clinical Impression:   1. Vaginal bleeding    2. Pregnancy, unspecified gestational age        Attestations:    Alejandra Hancock M.D. Please note that this dictation was completed with [employer], the computer voice recognition software. Quite often unanticipated grammatical, syntax, homophones, and other interpretive errors are inadvertently transcribed by the computer software. Please disregard these errors. Please excuse any errors that have escaped final proofreading. Thank you.

## 2022-06-23 LAB
CHLAMYDIA, EXTERNAL: NEGATIVE
HBSAG, EXTERNAL: NEGATIVE
HIV, EXTERNAL: NONREACTIVE
N. GONORRHEA, EXTERNAL: NEGATIVE
RUBELLA, EXTERNAL: NORMAL
T. PALLIDUM, EXTERNAL: NONREACTIVE

## 2022-09-15 ENCOUNTER — OFFICE VISIT (OUTPATIENT)
Dept: ORTHOPEDIC SURGERY | Age: 42
End: 2022-09-15
Payer: COMMERCIAL

## 2022-09-15 VITALS — BODY MASS INDEX: 45.32 KG/M2 | HEIGHT: 66 IN | WEIGHT: 282 LBS

## 2022-09-15 DIAGNOSIS — M54.12 CERVICAL RADICULOPATHY: Primary | ICD-10-CM

## 2022-09-15 DIAGNOSIS — G56.03 CARPAL TUNNEL SYNDROME ON BOTH SIDES: ICD-10-CM

## 2022-09-15 PROCEDURE — 99204 OFFICE O/P NEW MOD 45 MIN: CPT | Performed by: STUDENT IN AN ORGANIZED HEALTH CARE EDUCATION/TRAINING PROGRAM

## 2022-09-15 NOTE — PROGRESS NOTES
Paul Butler (: 1980) is a 38 y.o. female here for evaluation of the following chief complaint(s):  Neck Pain (Patient is 21 weeks pregnant.)       ASSESSMENT/PLAN:  Below is the assessment and plan developed based on review of pertinent history, physical exam, labs, studies, and medications. 1. Cervical radiculopathy  -     REFERRAL TO PHYSICAL THERAPY; Future  2. Carpal tunnel syndrome on both sides  -     REFERRAL TO PHYSICAL THERAPY; Future    Return in about 4 weeks (around 10/13/2022) for Follow up after PT. I will see Ms. Maged Cross back in approximately 4 weeks after physical therapy for cervical radiculopathy and left carpal tunnel syndrome. If pain is not improved after conservative management we will order MRI cervical spine and possible EMG/NCV of the left upper extremity. In the meantime she can take Aleve 2 tablets twice a day for 2 weeks for inflammation. Red flag symptoms discussed with the patient. Patient is to present to the emergency department if any of these symptoms occur. Patient verbalized understanding and agrees to proceed with the aforementioned plan. Thank you for allowing me to participate in the care of this patient. SUBJECTIVE/OBJECTIVE:    HPI    Patient is a pleasant 57-year-old female who is 21 weeks pregnant. At the onset of pregnancy she started developing left upper extremity pain in the C6 nerve root distribution: radial elbow, radial forearm, thumb/index/long finger. She has associated numbness and tingling. She has occasional intermittent right-sided symptoms in a similar distribution. Her pain is present throughout the day but worse at nighttime. She has been wearing a splint on her left wrist for relief. She has not trialed conservative management yet. Chief Complaint   Patient presents with    Neck Pain     Patient is 21 weeks pregnant.      Current Outpatient Medications   Medication Sig    azithromycin (ZITHROMAX) 250 mg tablet Take 2 tablets today, then take 1 tablet daily    methylPREDNISolone (MedroL) 4 mg tab Take 5 tablets day one, take 4 tablets day two, take 3 tablets day three,take 2 tablets day four, take 1 tablet day five. hydroCHLOROthiazide (HYDRODIURIL) 12.5 mg tablet Take 1 Tablet by mouth daily. norgestimate-ethinyl estradioL (Estarylla) 0.25-35 mg-mcg tab Estarylla 0.25 mg-35 mcg tablet   1 tablet by mouth everyday    triamcinolone acetonide (KENALOG) 0.1 % topical cream APPLY A VERY SMALL AMOUNT TOPICALLY TO ACTIVE PATCHES ON LOWER LEGS TWICE DAILY AS NEEDED FOR FLARES (Patient not taking: Reported on 10/21/2021)    omeprazole (PRILOSEC) 40 mg capsule Take 1 Cap by mouth daily. (Patient not taking: Reported on 10/21/2021)    metFORMIN ER (GLUCOPHAGE XR) 500 mg tablet Take 1 Tab by mouth two (2) times a day. (Patient not taking: Reported on 10/21/2021)     No current facility-administered medications for this visit.        Past Medical History:   Diagnosis Date    Dyslipidemia 4/13/2021     Past Surgical History:   Procedure Laterality Date    HX CHOLECYSTECTOMY  12/2021    HX LITHOTRIPSY  2016     Family History   Problem Relation Age of Onset    Cancer Mother     Hypertension Father      Social History     Tobacco Use    Smoking status: Never    Smokeless tobacco: Never   Vaping Use    Vaping Use: Never used   Substance Use Topics    Alcohol use: Yes    Drug use: Never      Social History     Tobacco Use   Smoking Status Never   Smokeless Tobacco Never     Social History     Substance and Sexual Activity   Alcohol Use Yes       Review of Systems  Red flag symptoms: Yes  Bowel/Bladder/Saddle Anesthesia: Denies  Weakness/Sensory Disturbance: Left upper extremity mild weakness and paresthesias in the C6 nerve root distribution  Ambulation/Falls: Ambulates without assist, no falls    Ht 5' 6\" (1.676 m)   Wt 282 lb (127.9 kg)   BMI 45.52 kg/m²      Physical Exam    GENERAL:  AAOx3, appears stated age, no distress  Body habitus: Pregnant    UPPER EXTREMITIES:  Motor: 5/5 in all myotomes C5-T1 bilaterally  Sensory: Intact to light touch in all dermatomes C5-T1 bilaterally  Reflexes: Normal C5-C7 reflexes bilaterally  Pathological reflexes: Negative Urbano's bilaterally  Special tests: Negative Spurling's    NECK/BACK:  Integumentary: Global swelling bilateral upper and lower extremities  Palpation/ROM: Some wrist tenderness to palpation, mild to moderate pain with motion of the wrist      IMAGING:    No radiographs indicated at today's visit. Patient is pregnant. We will consider MRI versus EMG at next visit if pain is not improved. An electronic signature was used to authenticate this note.   -- Harpal Hooper, DO

## 2022-09-15 NOTE — PROGRESS NOTES
1. Have you been to the ER, urgent care clinic since your last visit? Hospitalized since your last visit? No    2. Have you seen or consulted any other health care providers outside of the [address] [place] [address] since your last visit? Include any pap smears or colon screening. No  Chief Complaint   Patient presents with    Neck Pain     Patient is 21 weeks pregnant.

## 2022-09-19 ENCOUNTER — OFFICE VISIT (OUTPATIENT)
Dept: ORTHOPEDIC SURGERY | Age: 42
End: 2022-09-19
Payer: COMMERCIAL

## 2022-09-19 DIAGNOSIS — M54.12 CERVICAL RADICULOPATHY: ICD-10-CM

## 2022-09-19 DIAGNOSIS — G56.03 CARPAL TUNNEL SYNDROME ON BOTH SIDES: ICD-10-CM

## 2022-09-19 PROCEDURE — 97110 THERAPEUTIC EXERCISES: CPT | Performed by: PHYSICAL THERAPIST

## 2022-09-19 PROCEDURE — 97161 PT EVAL LOW COMPLEX 20 MIN: CPT | Performed by: PHYSICAL THERAPIST

## 2022-09-19 NOTE — PROGRESS NOTES
CERVICAL SPINE EVALUATION  Patient Name: César Marie  Date:2022  : 1980  [x]  Patient  Verified  Payor: Lex Grey / Plan: Sheryle Danas PPO / Product Type: PPO /    Total Treatment Time (min): 60  Referring physician: Ayla Bhatti  Patient is a 64-year-old female for to physical therapy for evaluation and treatment of bilateral hands. Patient states an approximate 5-week history of bilateral hand numbness and tingling. Patient is 22 weeks pregnant. Currently at rest she rates her pain at a 5 out of 10 and states will increase to a 10 out of 10 at its highest.  Symptoms are typically worse at night or after working all day. Symptoms are worse in the left hand and upper extremity however her right hand occasionally has symptoms. Symptoms are improved with ice and occasionally wearing her brace. 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 Patient sits with a rounded shoulder, forward head posture    Palpation: Patient is tender throughout the cervical paraspinals, bilateral upper trapezius, left scalenes. Patient has pain with inferior glide of first rib on the left. ROM: Cervical range of motion within functional limits and pain-free into all planes except for left rotation and left sidebending which is restricted approximately 25% with pain at endrange. Shoulder range of motion is within functional limits and pain-free. Patient demonstrates decreased wrist flexion and extension range of motion bilaterally. Strength: Decreased  strength on the left    Sensation: Intact to light touch    Joint mobility: Within normal limits    Special tests: Positive Phalen's, positive reverse Phalen's    NDI: 15    Treatment:  Performed initial evaluation. Treatment included. Provided and educated patient in home exercise program for cervical/thoracic spine mobility and postural strengthening.  We discussed patient goals and collaborated upon a plan of care. Ice posttreatment. We discussed at length proper positioning to reduce stress on rest while at work and sleeping. PT Exercise Log        Activity/Exercise Date  09/19/22    Activity/Exercise      Wrist extension   X      Finger extension X     Scap retractions   X                                                   Total treatment time 60 minutes    ASSESSMENT    Patient presents with impairments related to posture, cervical spine and shoulder range of motion, strength, numbness/tingling, impaired ability to perform ADLs, and participate in desired activity second to left neck/arm pain. Presentation consistent with carpal tunnel. She will benefit from outpatient physical therapy services to address above limitations and maximize function. Short-term Goals (1 week)  1. Patient will demonstrate independence with home exercise program to enhance recovery. Long-term Goals (4-6 weeks)  1. Patient will report at least 25% decrease in pain to maximize activity tolerance. 2. Patient will demonstrate full cervical rotation without increase in symptoms from baseline level. 3. Patient will report 10% decrease on NDI to improve quality of life. Frequency: 2x per week. 20 visits. Interventions to include but are not limited to joint mobilizations, myofascial release, soft tissue mobilization, therapeutic exercise, neuromuscular reeducation, dry needling, taping, and modalities as indicated. Diana Montalvo, PT 9/19/2022  3:28 PM    The referring physician has reviewed and approved this evaluation and plan of care as noted by the electronic signature attached to note.

## 2022-09-26 ENCOUNTER — OFFICE VISIT (OUTPATIENT)
Dept: ORTHOPEDIC SURGERY | Age: 42
End: 2022-09-26
Payer: COMMERCIAL

## 2022-09-26 DIAGNOSIS — G56.03 CARPAL TUNNEL SYNDROME ON BOTH SIDES: ICD-10-CM

## 2022-09-26 DIAGNOSIS — M54.12 CERVICAL RADICULOPATHY: Primary | ICD-10-CM

## 2022-09-26 PROCEDURE — 97110 THERAPEUTIC EXERCISES: CPT | Performed by: PHYSICAL THERAPIST

## 2022-09-26 PROCEDURE — 97140 MANUAL THERAPY 1/> REGIONS: CPT | Performed by: PHYSICAL THERAPIST

## 2022-09-29 NOTE — PROGRESS NOTES
PT DAILY TREATMENT NOTE    Patient Name: Errol Daugherty    : 1980  [x]  Patient  Verified  Payor: Evelin Krishna / Plan: Chaz Disla PPO / Product Type: PPO /    Total Treatment Time (min): 60 minutes  Referring Physician: Sanna Merlin, DO    1. Cervical radiculopathy      2. Carpal tunnel syndrome on both sides         ICD-10-CM ICD-9-CM    1. Cervical radiculopathy  M54.12 723.4       2. Carpal tunnel syndrome on both sides  G56.03 354.0           SUBJECTIVE  Subjective functional status/changes:   [] No changes reported    Patient reports continued bilateral hand numbness. Patient is working on posture while at work. OBJECTIVE/TREATMENT    Arrives with bilateral wrist braces. Manual Therapy x 15 mins:  Gentle cervical traction with OA flexion and suboccipital release. Passive range of motion for bilateral cervical side bend and rotation. Gentle wrist ROM and soft tissue massage to hand and wrist.      Therapeutic Exercise x 30 mins:   Strengthening/Endurance/ADL function/Neuromuscular reeducation activities/exercises supervised and completed as listed below.     EXERCISE 2022   Wrist flexion stretch X    Wrist extension stretch X   Pronation/supination X   Rows X   Chin tucks X   Upper trap stretch X                                                           Added/Changed Exercises:  []  Advanced to address: [x] functional strength/ROM deficits [] balance/proprioceptive tasks  []  Modified: [] per subjective reports [] for patient time constraints [] for clinic time constraints    Modality:  []  E-Stim: type _ x _ min     []att   []unatt   []w/ice   []w/heat  []  Ultrasound: []cont   []pulse    _ W/cm2 x _  min   []1MHz   []3MHz  [x]  Ice pack: post      []  Hot pack: pre    []  Other:     Neuromuscular Re-education Neuromuscular reeducation of movement, balance, coordination, posture, and/or proprioception for sitting or standing activities:  minutes    []  Kinesiotaping for   []  Neuromuscular reeducation to the VMO with use of [place] electrical stimulation in conjunction with active contraction and exercises. []  Balance/proprioceptive exercises and activities in clinic as listed on flow sheet above. Patient Education: [x] Review HEP    [] Progressed/Changed HEP based on:  [] positioning   [] body mechanics   [] transfers   [] heat/ice application      ASSESSMENT    Continues with bilateral hand numbness and tingling. Braces do help however she does continue with significant swelling.   Needs compression hose on lower extremities    Progress towards goals / Updated goals:    PLAN  []  Upgrade activities as tolerated      [x]  Continue plan of care  []  Discharge due to:_  [] Other:_       Sushila Guadarrama, PT 9/29/2022

## 2022-12-20 ENCOUNTER — HOSPITAL ENCOUNTER (EMERGENCY)
Age: 42
Discharge: HOME | End: 2022-12-20
Attending: OBSTETRICS & GYNECOLOGY | Admitting: OBSTETRICS & GYNECOLOGY
Payer: COMMERCIAL

## 2022-12-20 VITALS
HEIGHT: 66 IN | HEART RATE: 87 BPM | BODY MASS INDEX: 45.52 KG/M2 | SYSTOLIC BLOOD PRESSURE: 134 MMHG | DIASTOLIC BLOOD PRESSURE: 49 MMHG

## 2022-12-20 LAB
ALBUMIN SERPL-MCNC: 2.7 G/DL (ref 3.5–5)
ALBUMIN/GLOB SERPL: 0.8 {RATIO} (ref 1.1–2.2)
ALP SERPL-CCNC: 99 U/L (ref 45–117)
ALT SERPL-CCNC: 14 U/L (ref 12–78)
ANION GAP SERPL CALC-SCNC: 8 MMOL/L (ref 5–15)
AST SERPL-CCNC: 24 U/L (ref 15–37)
BILIRUB SERPL-MCNC: 0.5 MG/DL (ref 0.2–1)
BUN SERPL-MCNC: 5 MG/DL (ref 6–20)
BUN/CREAT SERPL: 10 (ref 12–20)
CALCIUM SERPL-MCNC: 9.1 MG/DL (ref 8.5–10.1)
CHLORIDE SERPL-SCNC: 108 MMOL/L (ref 97–108)
CO2 SERPL-SCNC: 24 MMOL/L (ref 21–32)
CREAT SERPL-MCNC: 0.52 MG/DL (ref 0.55–1.02)
CREAT UR-MCNC: 336 MG/DL
ERYTHROCYTE [DISTWIDTH] IN BLOOD BY AUTOMATED COUNT: 13.8 % (ref 11.5–14.5)
GLOBULIN SER CALC-MCNC: 3.2 G/DL (ref 2–4)
GLUCOSE SERPL-MCNC: 85 MG/DL (ref 65–100)
HCT VFR BLD AUTO: 37 % (ref 35–47)
HGB BLD-MCNC: 13.1 G/DL (ref 11.5–16)
MCH RBC QN AUTO: 31.8 PG (ref 26–34)
MCHC RBC AUTO-ENTMCNC: 35.4 G/DL (ref 30–36.5)
MCV RBC AUTO: 89.8 FL (ref 80–99)
NRBC # BLD: 0 K/UL (ref 0–0.01)
NRBC BLD-RTO: 0 PER 100 WBC
PLATELET # BLD AUTO: 178 K/UL (ref 150–400)
PMV BLD AUTO: 11 FL (ref 8.9–12.9)
POTASSIUM SERPL-SCNC: 3.1 MMOL/L (ref 3.5–5.1)
PROT SERPL-MCNC: 5.9 G/DL (ref 6.4–8.2)
PROT UR-MCNC: 36 MG/DL (ref 0–11.9)
PROT/CREAT UR-RTO: 0.1
RBC # BLD AUTO: 4.12 M/UL (ref 3.8–5.2)
SODIUM SERPL-SCNC: 140 MMOL/L (ref 136–145)
WBC # BLD AUTO: 8.8 K/UL (ref 3.6–11)

## 2022-12-20 PROCEDURE — 85027 COMPLETE CBC AUTOMATED: CPT

## 2022-12-20 PROCEDURE — 84156 ASSAY OF PROTEIN URINE: CPT

## 2022-12-20 PROCEDURE — 80053 COMPREHEN METABOLIC PANEL: CPT

## 2022-12-20 PROCEDURE — 36415 COLL VENOUS BLD VENIPUNCTURE: CPT

## 2022-12-20 PROCEDURE — 99283 EMERGENCY DEPT VISIT LOW MDM: CPT

## 2022-12-20 RX ORDER — LABETALOL 200 MG/1
200 TABLET, FILM COATED ORAL 2 TIMES DAILY
COMMUNITY

## 2022-12-20 NOTE — H&P
History & Physical    Name: Albert Glover MRN: [id number]  SSN: [id number]    YOB: 1980  Age: 38 y.o. Sex: female        Subjective:     Estimated Date of Delivery: 23  OB History    Para Term  AB Living   3 2   1   2   SAB IAB Ectopic Molar Multiple Live Births                    # Outcome Date GA Lbr Darrin/2nd Weight Sex Delivery Anes PTL Lv   3 Current            2 Para            1                 Ms. Timur Wilson is admitted with pregnancy at 34w4d for rule out SI preE. She was seen in the office today. She c/o HA and not feeling well. BPs were 148/90 and 146/94. Prenatal course is complicated by:    -CHTN: on labetalol 200 mg bid, baseline labs, EKG/echo normal  -AMA/APA: NIPT/FS and long bones normal  -BMI 48  -Fetal VSD: followed by [address] to deliver at [facility], will need echo after delivery  -H/o PTD with G1 at 34 weeks    H/o  x2 (, ). Tested to 6lbs 7oz. Ultrasound today: EFW >99th%ile, 6FL40VO, BPP 8/8, cephalic. ARUN 25cm, borderline polyhydramnios    Please see prenatal records for details. Past Medical History:   Diagnosis Date    Dyslipidemia 2021    Essential hypertension      Past Surgical History:   Procedure Laterality Date    HX CHOLECYSTECTOMY  2021    HX LITHOTRIPSY  2016    HX OTHER SURGICAL       Social History     Occupational History    Not on file   Tobacco Use    Smoking status: Never    Smokeless tobacco: Never   Vaping Use    Vaping Use: Never used   Substance and Sexual Activity    Alcohol use: Yes    Drug use: Never    Sexual activity: Not on file     Family History   Problem Relation Age of Onset    Cancer Mother     Hypertension Father        No Known Allergies  Prior to Admission medications    Medication Sig Start Date End Date Taking? Authorizing Provider   prenatal no122/iron/folic acid (PRENATAL MULTI PO) Take  by mouth.    Yes Provider, Historical   labetaloL (NORMODYNE) 200 mg tablet Take 200 mg by mouth two (2) times a day. Yes Provider, Historical   azithromycin (ZITHROMAX) 250 mg tablet Take 2 tablets today, then take 1 tablet daily 3/14/22   Micaela Ayon NP   methylPREDNISolone (MedroL) 4 mg tab Take 5 tablets day one, take 4 tablets day two, take 3 tablets day three,take 2 tablets day four, take 1 tablet day five. 3/14/22   Micaela Ayon NP   hydroCHLOROthiazide (HYDRODIURIL) 12.5 mg tablet Take 1 Tablet by mouth daily. 6/3/21   Jinny Snell NP   norgestimate-ethinyl estradioL Allyne Severe) 0.25-35 mg-mcg tab Estarylla 0.25 mg-35 mcg tablet   1 tablet by mouth everyday    Provider, Historical   triamcinolone acetonide (KENALOG) 0.1 % topical cream APPLY A VERY SMALL AMOUNT TOPICALLY TO ACTIVE PATCHES ON LOWER LEGS TWICE DAILY AS NEEDED FOR FLARES  Patient not taking: Reported on 10/21/2021 3/26/21   Provider, Historical   omeprazole (PRILOSEC) 40 mg capsule Take 1 Cap by mouth daily. Patient not taking: Reported on 10/21/2021 4/9/21   Jinny Snell NP   metFORMIN ER (GLUCOPHAGE XR) 500 mg tablet Take 1 Tab by mouth two (2) times a day. Patient not taking: Reported on 10/21/2021 4/9/21   Jinny Snell NP        Review of Systems: A comprehensive review of systems was negative except for that written in the HPI. Objective:     Vitals:  Vitals:    12/20/22 1230 12/20/22 1245 12/20/22 1300 12/20/22 1313   BP:  135/76 126/65 (!) 134/49   Pulse:    87   Height: 5' 6\" (1.676 m)           Physical Exam:  Patient without distress.   Lung: normal respiratory effort  Abdomen: soft, nontender  Fundus: soft and non tender  Perineum: blood absent, amniotic fluid absent  Cervical Exam: deferred  Membranes:  Intact  Fetal Heart Rate: Reactive  Baseline: 140 per minute  Variability: moderate  Accelerations: yes  Decelerations: none  Uterine contractions: none    Prenatal Labs:   Lab Results   Component Value Date/Time    ABO/Rh(D) A POSITIVE 06/12/2022 07:47 PM    Rubella, External Immune 2022 12:00 AM    HBsAg, External Negative 2022 12:00 AM    HIV, External Nonreactive 2022 12:00 AM         Assessment/Plan:     Active Problems:    * No active hospital problems. *     37 y/o  with IUP at 34 2/7 wks here for r/o SI preE.    -PreE labs wnl  -BPs mostly normal range and patient feeling better  -Will continue labetalol 200 mg bid and weekly surveillance  -Reviewed BP parameters and warning signs (pt has home BP cuff)

## 2022-12-20 NOTE — PROGRESS NOTES
1408-Martin in to see pt  1411-pt d/c home, undelivered ,verbalizes understanding of d/c instructions

## 2022-12-20 NOTE — DISCHARGE INSTRUCTIONS
High Blood Pressure in Pregnancy: Care Instructions  Overview     High blood pressure (hypertension) means that the force of blood against your artery walls is too strong. High blood pressure problems during pregnancy include:  Chronic hypertension. This is high blood pressure that starts before pregnancy. Gestational hypertension. This is high blood pressure that starts in the second or third trimester of pregnancy. Preeclampsia. This is a problem that includes high blood pressure and signs of organ injury during pregnancy. In some cases, it leads to eclampsia. Eclampsia causes seizures. High blood pressure during pregnancy can affect the amount of oxygen and nutrients your baby receives. This can affect how your baby grows. High blood pressure can also cause other serious problems for both you and your baby, such as placental abruption. To prevent problems, you and your baby will be watched very closely. You will have to check your blood pressure often during pregnancy and possibly after pregnancy. If your blood pressure rises suddenly or is very high during your pregnancy, your doctor may prescribe medicines. They can usually control blood pressure. If your blood pressure affects your or your baby's health, your doctor may need to deliver your baby early. After your baby is born, your blood pressure will probably improve. But sometimes blood pressure problems continue after birth. Follow-up care is a key part of your treatment and safety. Be sure to make and go to all appointments, and call your doctor if you are having problems. It's also a good idea to know your test results and keep a list of the medicines you take. How can you care for yourself at home? Take and write down your blood pressure at home if your doctor says to. Take your medicines exactly as prescribed. Call your doctor if you think you are having a problem with your medicine. Do not smoke.  This is one of the best things you can do to help your baby be healthy. If you need help quitting, talk to your doctor about stop-smoking programs and medicines. These can increase your chances of quitting for good. Don't gain too much weight during pregnancy. Talk to your doctor about how much weight gain is healthy. Eat a healthy diet. If your doctor says it's okay, get regular exercise. Walking or swimming several times a week can be healthy for you and your baby. Reduce stress, and find time to relax. This is very important if you continue to work or have a busy schedule. It's also important if you have small children at home. When should you call for help? Share this information with your partner or a friend. They can help you watch for warning signs. Call 911  anytime you think you may need emergency care. For example, call if:    You passed out (lost consciousness). You have a seizure. Call your doctor now or seek immediate medical care if:    You have symptoms of preeclampsia, such as:  Sudden swelling of your face, hands, or feet. New vision problems (such as dimness, blurring, or seeing spots). A severe headache. Your blood pressure is very high, such as 160/110 or higher. Your blood pressure is higher than your doctor told you it should be, or it rises quickly. You have new nausea or vomiting. You think that you are in labor. You have pain in your belly or pelvis. Watch closely for changes in your health, and be sure to contact your doctor if:    You gain weight rapidly. Where can you learn more? Go to http://www.gray.com/  Enter A052 in the search box to learn more about \"High Blood Pressure in Pregnancy: Care Instructions. \"  Current as of: February 23, 2022               Content Version: 13.4  © 6770-1734 [employer]. Care instructions adapted under license by [employer] (which disclaims liability or warranty for this information).  If you have questions about a medical condition or this instruction, always ask your healthcare professional. [place] [address] any warranty or liability for your use of this information.

## 2022-12-20 NOTE — PROGRESS NOTES
1225 Patient arrived from Dr Mai Thao office for r/o elevated BP's.  Positive fetal movement,no vaginal bleeding or leaking of fluid    1239 Notified Dr Peggy Calderón about recent BP and MD morales labs sent

## 2022-12-22 ENCOUNTER — HOSPITAL ENCOUNTER (EMERGENCY)
Age: 42
Discharge: HOME | End: 2022-12-22
Payer: COMMERCIAL

## 2022-12-22 VITALS
RESPIRATION RATE: 18 BRPM | SYSTOLIC BLOOD PRESSURE: 135 MMHG | HEART RATE: 99 BPM | TEMPERATURE: 98 F | DIASTOLIC BLOOD PRESSURE: 72 MMHG

## 2022-12-22 LAB
ALBUMIN SERPL-MCNC: 2.8 G/DL (ref 3.5–5)
ALBUMIN/GLOB SERPL: 0.7 {RATIO} (ref 1.1–2.2)
ALP SERPL-CCNC: 110 U/L (ref 45–117)
ALT SERPL-CCNC: 17 U/L (ref 12–78)
ANION GAP SERPL CALC-SCNC: 6 MMOL/L (ref 5–15)
AST SERPL-CCNC: 58 U/L (ref 15–37)
BILIRUB SERPL-MCNC: 0.6 MG/DL (ref 0.2–1)
BUN SERPL-MCNC: 4 MG/DL (ref 6–20)
BUN/CREAT SERPL: 8 (ref 12–20)
CALCIUM SERPL-MCNC: 8.9 MG/DL (ref 8.5–10.1)
CHLORIDE SERPL-SCNC: 105 MMOL/L (ref 97–108)
CO2 SERPL-SCNC: 26 MMOL/L (ref 21–32)
CREAT SERPL-MCNC: 0.48 MG/DL (ref 0.55–1.02)
CREAT UR-MCNC: 220 MG/DL
GLOBULIN SER CALC-MCNC: 3.8 G/DL (ref 2–4)
GLUCOSE SERPL-MCNC: 80 MG/DL (ref 65–100)
POTASSIUM SERPL-SCNC: 3.6 MMOL/L (ref 3.5–5.1)
PROT SERPL-MCNC: 6.6 G/DL (ref 6.4–8.2)
PROT UR-MCNC: 19 MG/DL (ref 0–11.9)
PROT/CREAT UR-RTO: 0.1
SODIUM SERPL-SCNC: 137 MMOL/L (ref 136–145)

## 2022-12-22 PROCEDURE — 80053 COMPREHEN METABOLIC PANEL: CPT

## 2022-12-22 PROCEDURE — 74011250637 HC RX REV CODE- 250/637: Performed by: OBSTETRICS & GYNECOLOGY

## 2022-12-22 PROCEDURE — 36415 COLL VENOUS BLD VENIPUNCTURE: CPT

## 2022-12-22 PROCEDURE — 99283 EMERGENCY DEPT VISIT LOW MDM: CPT

## 2022-12-22 PROCEDURE — 84156 ASSAY OF PROTEIN URINE: CPT

## 2022-12-22 RX ORDER — BUTALBITAL, ACETAMINOPHEN AND CAFFEINE 50; 325; 40 MG/1; MG/1; MG/1
2 TABLET ORAL
Status: DISCONTINUED | OUTPATIENT
Start: 2022-12-22 | End: 2022-12-23 | Stop reason: HOSPADM

## 2022-12-22 RX ADMIN — BUTALBITAL, ACETAMINOPHEN, AND CAFFEINE 2 TABLET: 50; 325; 40 TABLET ORAL at 16:56

## 2022-12-22 NOTE — PROGRESS NOTES
Her labs are all normal with the exception of a slightly elevated AST. Will discharge home with close followup I spoke with Dr. Romana Alamo and her can see her on Tuesday. She believes she already has an appt for them. Precautions for return reviewed. Recent Results (from the past 12 hour(s))   METABOLIC PANEL, COMPREHENSIVE    Collection Time: 12/22/22  5:15 PM   Result Value Ref Range    Sodium 137 136 - 145 mmol/L    Potassium 3.6 3.5 - 5.1 mmol/L    Chloride 105 97 - 108 mmol/L    CO2 26 21 - 32 mmol/L    Anion gap 6 5 - 15 mmol/L    Glucose 80 65 - 100 mg/dL    BUN 4 (L) 6 - 20 MG/DL    Creatinine 0.48 (L) 0.55 - 1.02 MG/DL    BUN/Creatinine ratio 8 (L) 12 - 20      eGFR >60 >60 ml/min/1.73m2    Calcium 8.9 8.5 - 10.1 MG/DL    Bilirubin, total 0.6 0.2 - 1.0 MG/DL    ALT (SGPT) 17 12 - 78 U/L    AST (SGOT) 58 (H) 15 - 37 U/L    Alk. phosphatase 110 45 - 117 U/L    Protein, total 6.6 6.4 - 8.2 g/dL    Albumin 2.8 (L) 3.5 - 5.0 g/dL    Globulin 3.8 2.0 - 4.0 g/dL    A-G Ratio 0.7 (L) 1.1 - 2.2     PROTEIN/CREATININE RATIO, URINE    Collection Time: 12/22/22  5:15 PM   Result Value Ref Range    Protein, urine random 19 (H) 0.0 - 11.9 mg/dL    Creatinine, urine random 220.00 mg/dL    Protein/Creat.  urine Ratio 0.1

## 2022-12-22 NOTE — DISCHARGE INSTRUCTIONS
High Blood Pressure: Care Instructions  Overview     It's normal for blood pressure to go up and down throughout the day. But if it stays up, you have high blood pressure. Another name for high blood pressure is hypertension. Despite what a lot of people think, high blood pressure usually doesn't cause headaches or make you feel dizzy or lightheaded. It usually has no symptoms. But it does increase your risk of stroke, heart attack, and other problems. You and your doctor will talk about your risks of these problems based on your blood pressure. Your doctor will give you a goal for your blood pressure. Your goal will be based on your health and your age. Lifestyle changes, such as eating healthy and being active, are always important to help lower blood pressure. You might also take medicine to reach your blood pressure goal.  Follow-up care is a key part of your treatment and safety. Be sure to make and go to all appointments, and call your doctor if you are having problems. It's also a good idea to know your test results and keep a list of the medicines you take. How can you care for yourself at home? Medical treatment  If you stop taking your medicine, your blood pressure will go back up. You may take one or more types of medicine to lower your blood pressure. Be safe with medicines. Take your medicine exactly as prescribed. Call your doctor if you think you are having a problem with your medicine. Talk to your doctor before you start taking aspirin every day. Aspirin can help certain people lower their risk of a heart attack or stroke. But taking aspirin isn't right for everyone, because it can cause serious bleeding. See your doctor regularly. You may need to see the doctor more often at first or until your blood pressure comes down. If you are taking blood pressure medicine, talk to your doctor before you take decongestants or anti-inflammatory medicine, such as ibuprofen.  Some of these medicines can raise blood pressure. Learn how to check your blood pressure at home. Lifestyle changes  Stay at a healthy weight. This is especially important if you put on weight around the waist. Losing even 10 pounds can help you lower your blood pressure. If your doctor recommends it, get more exercise. Walking is a good choice. Bit by bit, increase the amount you walk every day. Try for at least 30 minutes on most days of the week. You also may want to swim, bike, or do other activities. Avoid or limit alcohol. Talk to your doctor about whether you can drink any alcohol. Try to limit how much sodium you eat to less than 2,300 milligrams (mg) a day. Your doctor may ask you to try to eat less than 1,500 mg a day. Eat plenty of fruits (such as bananas and oranges), vegetables, legumes, whole grains, and low-fat dairy products. Lower the amount of saturated fat in your diet. Saturated fat is found in animal products such as milk, cheese, and meat. Limiting these foods may help you lose weight and also lower your risk for heart disease. Do not smoke. Smoking increases your risk for heart attack and stroke. If you need help quitting, talk to your doctor about stop-smoking programs and medicines. These can increase your chances of quitting for good. When should you call for help? Call 911  anytime you think you may need emergency care. This may mean having symptoms that suggest that your blood pressure is causing a serious heart or blood vessel problem. Your blood pressure may be over 180/120. For example, call 911 if:    You have symptoms of a heart attack. These may include:  Chest pain or pressure, or a strange feeling in the chest.  Sweating. Shortness of breath. Nausea or vomiting. Pain, pressure, or a strange feeling in the back, neck, jaw, or upper belly or in one or both shoulders or arms. Lightheadedness or sudden weakness. A fast or irregular heartbeat. You have symptoms of a stroke.  These may include:  Sudden numbness, tingling, weakness, or loss of movement in your face, arm, or leg, especially on only one side of your body. Sudden vision changes. Sudden trouble speaking. Sudden confusion or trouble understanding simple statements. Sudden problems with walking or balance. A sudden, severe headache that is different from past headaches. You have severe back or belly pain. Do not wait until your blood pressure comes down on its own. Get help right away. Call your doctor now or seek immediate care if:    Your blood pressure is much higher than normal (such as 180/120 or higher), but you don't have symptoms. You think high blood pressure is causing symptoms, such as:  Severe headache. Blurry vision. Watch closely for changes in your health, and be sure to contact your doctor if:    Your blood pressure measures higher than your doctor recommends at least 2 times. That means the top number is higher or the bottom number is higher, or both. You think you may be having side effects from your blood pressure medicine. Where can you learn more? Go to http://www.gray.com/  Enter [id number] in the search box to learn more about \"High Blood Pressure: Care Instructions. \"  Current as of: October 6, 2021               Content Version: 13.4  © 2006-2022 Healthwise, Incorporated. Care instructions adapted under license by [employer] (which disclaims liability or warranty for this information). If you have questions about a medical condition or this instruction, always ask your healthcare professional. [place] [address] any warranty or liability for your use of this information. Weeks 34 to 36 of Your Pregnancy: Care Instructions  Your belly has grown quite large. It's almost time to give birth! Your baby's lungs are almost ready to breathe air. The skull bones are firm enough to protect your baby's head.  But they're soft enough to move down through the birth canal.  You might be wondering what to expect during labor. Because each birth is different, there's no way to know exactly what childbirth will be like for you. Talk to your doctor or midwife about any concerns you have. Donaldo Frank probably have a test for group B streptococcus (GBS). GBS is bacteria that can live in the vagina and rectum. GBS can make your baby sick after birth. If you test positive, you'll get antibiotics during labor. Choose what type of pain relief you would like during labor. You can choose from a few types, including medicine and non-medicine options. You may want to use several types of pain relief. Know how medicines can help with pain during labor. Some medicines lower anxiety and help with some of the pain. Others make your lower body numb so that you will feel less pain. Tell your doctor about your pain medicine choice. Do this before you start labor or very early in your labor. You may be able to change your mind during labor. Learn about the stages of labor. The first stage includes early labor, active labor, and transition. Contractions start in early labor. During active labor, contractions get stronger, last longer, and happen more often. In transition, your cervix stretches and contractions are very strong. The second stage starts when you're ready to push. During this stage, your baby is born. During the third stage, you'll have a few more contractions to push out the placenta. Follow-up care is a key part of your treatment and safety. Be sure to make and go to all appointments, and call your doctor if you are having problems. It's also a good idea to know your test results and keep a list of the medicines you take. Where can you learn more? Go to http://www.gray.com/  Enter B912 in the search box to learn more about \"Weeks 34 to 36 of Your Pregnancy: Care Instructions. \"  Current as of: February 23, 2022 Content Version: 13.4  © 0094-1587 Healthwise, Incorporated. Care instructions adapted under license by [email or web address] (which disclaims liability or warranty for this information). If you have questions about a medical condition or this instruction, always ask your healthcare professional. [address] any warranty or liability for your use of this information.

## 2022-12-22 NOTE — PROGRESS NOTES
1605: , 34.6 wks, pt of Dr Audra Banuelos arrived ambulatory to [facility] c/o HA without relief after 1g of tylenol ~1030 this morning, decreased FM for the last 2 days, and increased BPs taken with her cuff at home. Denies LOF, VB, visual changes, or RUQ pain. 1615: Dr Vicky Mejia made aware of pts arrival and cc, brief sbar given. 1624: Dr Vicky Mejia at bedside to assess. Orders for PIH labs, 500cc fluid bolus, and Fioricet received.

## 2022-12-22 NOTE — H&P
History & Physical    Name: Aubrey Ross MRN: [id number]  SSN: [id number]    YOB: 1980  Age: 38 y.o. Sex: female      Subjective:     Reason for Admission:  Pregnancy and elevated BP and decreased fetal movement. History of Present Illness: Ms. Cony Hernández is a 38 y.o.  female with an estimated gestational age of 34w7d with Estimated Date of Delivery: 23. Patient complains of feeling poorly and a headache that has been going on for a few days (since at least pt was here for triage on ) and elevated BPs at home today (several in severe range) as well as worsening swelling and decreased fetal movement. Pt has known CHTN for which she takes labetalol BID. Additionally pt reports she may be dehydrated. She has had poor sleep over the past few nights due to bad carpal tunnel syndrome. She reports that it seems her BPs are worse in the AM.      She notes she was recently diagnosed with polyhydramnios as well. Pt denies RUQ pain. She notes no VB or LOF. She denies CTX.       OB History    Para Term  AB Living   3 2   1   2   SAB IAB Ectopic Molar Multiple Live Births                    # Outcome Date GA Lbr Darrin/2nd Weight Sex Delivery Anes PTL Lv   3 Current            2 Para            1               Past Medical History:   Diagnosis Date    Dyslipidemia 2021    Essential hypertension      Past Surgical History:   Procedure Laterality Date    HX CHOLECYSTECTOMY  2021    HX LITHOTRIPSY  2016    HX OTHER SURGICAL       Social History     Occupational History    Not on file   Tobacco Use    Smoking status: Never    Smokeless tobacco: Never   Vaping Use    Vaping Use: Never used   Substance and Sexual Activity    Alcohol use: Yes    Drug use: Never    Sexual activity: Not on file      Family History   Problem Relation Age of Onset    Cancer Mother     Hypertension Father        No Known Allergies  Prior to Admission medications    Medication Sig Start Date End Date Taking? Authorizing Provider   prenatal no122/iron/folic acid (PRENATAL MULTI PO) Take  by mouth. Provider, Historical   labetaloL (NORMODYNE) 200 mg tablet Take 200 mg by mouth two (2) times a day. Provider, Historical   azithromycin (ZITHROMAX) 250 mg tablet Take 2 tablets today, then take 1 tablet daily 3/14/22   Marjorie Singh NP   methylPREDNISolone (MedroL) 4 mg tab Take 5 tablets day one, take 4 tablets day two, take 3 tablets day three,take 2 tablets day four, take 1 tablet day five. 3/14/22   Marjorie Singh NP   hydroCHLOROthiazide (HYDRODIURIL) 12.5 mg tablet Take 1 Tablet by mouth daily. 6/3/21   Dequan Russo NP   norgestimate-ethinyl estradioL Roshan Fass) 0.25-35 mg-mcg tab Estarylla 0.25 mg-35 mcg tablet   1 tablet by mouth everyday    Provider, Historical   triamcinolone acetonide (KENALOG) 0.1 % topical cream APPLY A VERY SMALL AMOUNT TOPICALLY TO ACTIVE PATCHES ON LOWER LEGS TWICE DAILY AS NEEDED FOR FLARES  Patient not taking: Reported on 10/21/2021 3/26/21   Provider, Historical   omeprazole (PRILOSEC) 40 mg capsule Take 1 Cap by mouth daily. Patient not taking: Reported on 10/21/2021 4/9/21   Dequan Russo NP   metFORMIN ER (GLUCOPHAGE XR) 500 mg tablet Take 1 Tab by mouth two (2) times a day. Patient not taking: Reported on 10/21/2021 4/9/21   Dequan Russo NP        Review of Systems:  A comprehensive review of systems was negative except for that written in the History of Present Illness. Objective:     Vitals:    Vitals:    22 1608   BP: 135/72   Pulse: 99   Resp: 18   Temp: 98 °F (36.7 °C)      Temp (24hrs), Av °F (36.7 °C), Min:98 °F (36.7 °C), Max:98 °F (36.7 °C)    BP  Min: 135/72  Max: 135/72     Physical Exam:  Patient without distress.   Abdomen: soft, nontender, gravid  Lower Extremities:  - Edema 3+     Membranes:  Intact  Uterine Activity:  None  Fetal Heart Rate:  Reactive although intermittent loss of tracing initially    Lab/Data Review:  No results found for this or any previous visit (from the past 24 hour(s)). Assessment and Plan: Active Problems:    * No active hospital problems. *     Pt is a 39 yo  with known CHTN and concern for SEVEN, HA and decreased fetal movement.     1) CHTN r/o SEVEN  - BPs normal here  - Will send labs  - Pt has her home BP cuff so we will compare with L&D cuff    2) HA  - Trial of fioricet  - IV fluids since pt is concerned she may be dehydrated  - PO intake encouraged    3) Decreased fetal movement  - NST

## 2022-12-25 ENCOUNTER — HOSPITAL ENCOUNTER (EMERGENCY)
Age: 42
Discharge: HOME | End: 2022-12-25
Payer: COMMERCIAL

## 2022-12-25 VITALS
BODY MASS INDEX: 48.32 KG/M2 | HEIGHT: 65 IN | RESPIRATION RATE: 16 BRPM | TEMPERATURE: 97.5 F | HEART RATE: 93 BPM | SYSTOLIC BLOOD PRESSURE: 139 MMHG | WEIGHT: 290 LBS | DIASTOLIC BLOOD PRESSURE: 82 MMHG

## 2022-12-25 PROBLEM — O10.913 CHRONIC HYPERTENSION COMPLICATING OR REASON FOR CARE DURING PREGNANCY, THIRD TRIMESTER: Status: ACTIVE | Noted: 2022-12-25

## 2022-12-25 PROBLEM — O46.93 VAGINAL BLEEDING IN PREGNANCY, THIRD TRIMESTER: Status: ACTIVE | Noted: 2022-12-25

## 2022-12-25 PROCEDURE — 99283 EMERGENCY DEPT VISIT LOW MDM: CPT

## 2022-12-25 RX ORDER — GUAIFENESIN 100 MG/5ML
81 LIQUID (ML) ORAL DAILY
COMMUNITY

## 2022-12-25 NOTE — ROUTINE PROCESS
2022  4:47 AM  Pt of MD  arrives ambulatory with significant other c/o bleeding. Pt is  , GA 35/2. PMH significant for CHTN and polyhydramnios. Pt has NKA and takes daily PNV. Pt denies LOF, or contractions, endorses (+) fetal movement. Pt denies headache, blurred vision, or RUQ pain. MD notified of pt arrival. Pt oriented to room and call bell within reach.       [id number] at bedside, speculum exam done, SVE /-4, US confirms vertex, plan is to monitor for 1hr    0615 Discharge instructions reviewed and signed, patient discharged with significant other

## 2022-12-25 NOTE — H&P
History & Physical    Name: Deidra Griffith MRN: [id number]  SSN: [id number]    YOB: 1980  Age: 38 y.o. Sex: female        Subjective:     Estimated Date of Delivery: 23  OB History    Para Term  AB Living   3 2   1   2   SAB IAB Ectopic Molar Multiple Live Births                    # Outcome Date GA Lbr Darrin/2nd Weight Sex Delivery Anes PTL Lv   3 Current            2 Para            1                 Ms. Delaney Torrez  a  at 35w2d presents for bleeding x 1 occurrence about 1.5 hours ago, reports intercourse within the last 72 hours. Had bleeding in early pregnancy as well, denies awareness of subchorionic hemorrhage or low-lying placenta. Denies LOF, reports +FM, but thinks it feels less frequent that \"normal\". Prenatal course was complicated by advanced maternal age, chronic hypertension, polyhydramnios, vaginal bleeding, and hx of PTL at [facility] for GHTN. Hx of GHTN x 2 . H/O  x 2 (, ), proven pelvis to 6lb 7oz. Please see prenatal records for details. Past Medical History:   Diagnosis Date    Dyslipidemia 2021    Essential hypertension      Past Surgical History:   Procedure Laterality Date    HX CHOLECYSTECTOMY  2021    HX LITHOTRIPSY  2016    HX OTHER SURGICAL       Social History     Occupational History    Not on file   Tobacco Use    Smoking status: Never    Smokeless tobacco: Never   Vaping Use    Vaping Use: Never used   Substance and Sexual Activity    Alcohol use: Yes    Drug use: Never    Sexual activity: Not on file     Family History   Problem Relation Age of Onset    Cancer Mother     Hypertension Father        No Known Allergies  Prior to Admission medications    Medication Sig Start Date End Date Taking? Authorizing Provider   prenatal no122/iron/folic acid (PRENATAL MULTI PO) Take  by mouth. Provider, Historical   labetaloL (NORMODYNE) 200 mg tablet Take 200 mg by mouth two (2) times a day.     Provider, Historical   azithromycin (ZITHROMAX) 250 mg tablet Take 2 tablets today, then take 1 tablet daily 3/14/22   Crystal Burgess NP   methylPREDNISolone (MedroL) 4 mg tab Take 5 tablets day one, take 4 tablets day two, take 3 tablets day three,take 2 tablets day four, take 1 tablet day five. 3/14/22   Crystal Burgess NP   hydroCHLOROthiazide (HYDRODIURIL) 12.5 mg tablet Take 1 Tablet by mouth daily. 6/3/21   Jeramiestdavid Troy NP   norgestimate-ethinyl estradioL Iliana Ratel) 0.25-35 mg-mcg tab Estarylla 0.25 mg-35 mcg tablet   1 tablet by mouth everyday    Provider, Historical   triamcinolone acetonide (KENALOG) 0.1 % topical cream APPLY A VERY SMALL AMOUNT TOPICALLY TO ACTIVE PATCHES ON LOWER LEGS TWICE DAILY AS NEEDED FOR FLARES  Patient not taking: Reported on 10/21/2021 3/26/21   Provider, Historical   omeprazole (PRILOSEC) 40 mg capsule Take 1 Cap by mouth daily. Patient not taking: Reported on 10/21/2021 4/9/21   Marjorie Troy NP   metFORMIN ER (GLUCOPHAGE XR) 500 mg tablet Take 1 Tab by mouth two (2) times a day. Patient not taking: Reported on 10/21/2021 4/9/21   Marjorie Troy NP        Review of Systems: A comprehensive review of systems was negative except for that written in the HPI. Objective:     Vitals:  Vitals:    12/25/22 0451   BP: 139/82   Pulse: 93   Resp: 16   Temp: 97.5 °F (36.4 °C)        Physical Exam:  Patient without distress.   Heart: Regular rate and rhythm, S1S2 present, or without murmur or extra heart sounds  Lung: clear to auscultation throughout lung fields, no wheezes, no rales, no rhonchi, and normal respiratory effort  Abdomen: soft, nontender, normal bowel sounds  Fundus: soft and non tender  Perineum: blood absent, amniotic fluid absent  Cervical Exam: Closed/Thick/High; vertex by VSCAN at bedside  SSE: no blood noted in vaginal vault or at cervix  Membranes:  Intact  Fetal Heart Rate: Reactive  Baseline: 130 beats per minute  Variability: moderate  Accelerations: yes  Decelerations: none  Uterine contractions: none  Uterine irritability: yes    Prenatal Labs:   Lab Results   Component Value Date/Time    ABO/Rh(D) A POSITIVE 06/12/2022 07:47 PM    Rubella, External Immune 06/23/2022 12:00 AM    HBsAg, External Negative 06/23/2022 12:00 AM    HIV, External Nonreactive 06/23/2022 12:00 AM         Assessment/Plan:     Active Problems:    Chronic hypertension complicating or reason for care during pregnancy, third trimester (12/25/2022)      Overview: Labetalol 200 mg PO BID      Vaginal bleeding in pregnancy, third trimester (12/25/2022)       Plan: Discharge to home with bleeding precautions, follow up with provider on call at GYN if bleeding or concerns return . Group B Strep was collected at last prenatal visit, results pending. All patient questions answered, verbalized understanding.     Bhavna Bennett CNM

## 2022-12-25 NOTE — DISCHARGE INSTRUCTIONS
Weeks 34 to 36 of Your Pregnancy: Care Instructions  Your belly has grown quite large. It's almost time to give birth! Your baby's lungs are almost ready to breathe air. The skull bones are firm enough to protect your baby's head. But they're soft enough to move down through the birth canal.  You might be wondering what to expect during labor. Because each birth is different, there's no way to know exactly what childbirth will be like for you. Talk to your doctor or midwife about any concerns you have. Willow Cristina probably have a test for group B streptococcus (GBS). GBS is bacteria that can live in the vagina and rectum. GBS can make your baby sick after birth. If you test positive, you'll get antibiotics during labor. Choose what type of pain relief you would like during labor. You can choose from a few types, including medicine and non-medicine options. You may want to use several types of pain relief. Know how medicines can help with pain during labor. Some medicines lower anxiety and help with some of the pain. Others make your lower body numb so that you will feel less pain. Tell your doctor about your pain medicine choice. Do this before you start labor or very early in your labor. You may be able to change your mind during labor. Learn about the stages of labor. The first stage includes early labor, active labor, and transition. Contractions start in early labor. During active labor, contractions get stronger, last longer, and happen more often. In transition, your cervix stretches and contractions are very strong. The second stage starts when you're ready to push. During this stage, your baby is born. During the third stage, you'll have a few more contractions to push out the placenta. Follow-up care is a key part of your treatment and safety. Be sure to make and go to all appointments, and call your doctor if you are having problems.  It's also a good idea to know your test results and keep a list of the medicines you take. Where can you learn more? Go to http://www.gray.com/  Enter B912 in the search box to learn more about \"Weeks 34 to 36 of Your Pregnancy: Care Instructions. \"  Current as of: February 23, 2022               Content Version: 13.4  © 4472-5363 Healthwise, [employer]. Care instructions adapted under license by [employer] (which disclaims liability or warranty for this information). If you have questions about a medical condition or this instruction, always ask your healthcare professional. [address] any warranty or liability for your use of this information.

## 2022-12-30 LAB — GRBS, EXTERNAL: NEGATIVE

## 2023-01-08 ENCOUNTER — HOSPITAL ENCOUNTER (INPATIENT)
Age: 43
LOS: 6 days | Discharge: HOME | End: 2023-01-14
Attending: OBSTETRICS & GYNECOLOGY | Admitting: OBSTETRICS & GYNECOLOGY
Payer: COMMERCIAL

## 2023-01-08 DIAGNOSIS — O10.913 CHRONIC HYPERTENSION COMPLICATING OR REASON FOR CARE DURING PREGNANCY, THIRD TRIMESTER: Primary | ICD-10-CM

## 2023-01-08 PROBLEM — O10.919 CHRONIC HYPERTENSION AFFECTING PREGNANCY: Status: ACTIVE | Noted: 2023-01-08

## 2023-01-08 PROBLEM — O40.3XX0 POLYHYDRAMNIOS AFFECTING PREGNANCY IN THIRD TRIMESTER: Status: ACTIVE | Noted: 2023-01-08

## 2023-01-08 PROBLEM — O99.213 MATERNAL OBESITY SYNDROME, ANTEPARTUM, THIRD TRIMESTER: Status: ACTIVE | Noted: 2023-01-08

## 2023-01-08 LAB
ALBUMIN SERPL-MCNC: 2.4 G/DL (ref 3.5–5)
ALBUMIN/GLOB SERPL: 0.7 (ref 1.1–2.2)
ALP SERPL-CCNC: 128 U/L (ref 45–117)
ALT SERPL-CCNC: 18 U/L (ref 12–78)
ANION GAP SERPL CALC-SCNC: 9 MMOL/L (ref 5–15)
AST SERPL-CCNC: 33 U/L (ref 15–37)
BILIRUB SERPL-MCNC: 0.6 MG/DL (ref 0.2–1)
BUN SERPL-MCNC: 4 MG/DL (ref 6–20)
BUN/CREAT SERPL: 8 (ref 12–20)
CALCIUM SERPL-MCNC: 8.8 MG/DL (ref 8.5–10.1)
CHLORIDE SERPL-SCNC: 108 MMOL/L (ref 97–108)
CO2 SERPL-SCNC: 23 MMOL/L (ref 21–32)
CREAT SERPL-MCNC: 0.51 MG/DL (ref 0.55–1.02)
ERYTHROCYTE [DISTWIDTH] IN BLOOD BY AUTOMATED COUNT: 13.8 % (ref 11.5–14.5)
GLOBULIN SER CALC-MCNC: 3.4 G/DL (ref 2–4)
GLUCOSE SERPL-MCNC: 94 MG/DL (ref 65–100)
HCT VFR BLD AUTO: 35.8 % (ref 35–47)
HGB BLD-MCNC: 12.6 G/DL (ref 11.5–16)
MCH RBC QN AUTO: 30.7 PG (ref 26–34)
MCHC RBC AUTO-ENTMCNC: 35.2 G/DL (ref 30–36.5)
MCV RBC AUTO: 87.1 FL (ref 80–99)
NRBC # BLD: 0 K/UL (ref 0–0.01)
NRBC BLD-RTO: 0 PER 100 WBC
PLATELET # BLD AUTO: 165 K/UL (ref 150–400)
PMV BLD AUTO: 11.1 FL (ref 8.9–12.9)
POTASSIUM SERPL-SCNC: 3.4 MMOL/L (ref 3.5–5.1)
PROT SERPL-MCNC: 5.8 G/DL (ref 6.4–8.2)
RBC # BLD AUTO: 4.11 M/UL (ref 3.8–5.2)
SODIUM SERPL-SCNC: 140 MMOL/L (ref 136–145)
WBC # BLD AUTO: 8.5 K/UL (ref 3.6–11)

## 2023-01-08 PROCEDURE — 3E0DXGC INTRODUCTION OF OTHER THERAPEUTIC SUBSTANCE INTO MOUTH AND PHARYNX, EXTERNAL APPROACH: ICD-10-PCS | Performed by: OBSTETRICS & GYNECOLOGY

## 2023-01-08 PROCEDURE — 75410000002 HC LABOR FEE PER 1 HR

## 2023-01-08 PROCEDURE — 85027 COMPLETE CBC AUTOMATED: CPT

## 2023-01-08 PROCEDURE — 59200 INSERT CERVICAL DILATOR: CPT

## 2023-01-08 PROCEDURE — 74011250637 HC RX REV CODE- 250/637: Performed by: OBSTETRICS & GYNECOLOGY

## 2023-01-08 PROCEDURE — 80053 COMPREHEN METABOLIC PANEL: CPT

## 2023-01-08 PROCEDURE — 65270000029 HC RM PRIVATE

## 2023-01-08 RX ORDER — NALBUPHINE HYDROCHLORIDE 10 MG/ML
10 INJECTION, SOLUTION INTRAMUSCULAR; INTRAVENOUS; SUBCUTANEOUS
Status: DISCONTINUED | OUTPATIENT
Start: 2023-01-08 | End: 2023-01-11 | Stop reason: HOSPADM

## 2023-01-08 RX ORDER — ONDANSETRON 2 MG/ML
4 INJECTION INTRAMUSCULAR; INTRAVENOUS
Status: DISCONTINUED | OUTPATIENT
Start: 2023-01-08 | End: 2023-01-11 | Stop reason: HOSPADM

## 2023-01-08 RX ORDER — SODIUM CHLORIDE, SODIUM LACTATE, POTASSIUM CHLORIDE, CALCIUM CHLORIDE 600; 310; 30; 20 MG/100ML; MG/100ML; MG/100ML; MG/100ML
75 INJECTION, SOLUTION INTRAVENOUS CONTINUOUS
Status: DISCONTINUED | OUTPATIENT
Start: 2023-01-08 | End: 2023-01-14

## 2023-01-08 RX ADMIN — Medication 25 MCG: at 20:08

## 2023-01-08 NOTE — PROGRESS NOTES
1/8/2023  3:39 PM  Pt arrived for scheduled induction for hypertension, poly, and AMA. Pt endorses positive fetal movement, denies LOF/VB    1720:  Dr Compa Shay at bedside  Speculum exam performed. SVE fingertip/50/-3. Unable to place mishra balloon. Plan to begin induction with cytotec    1930:  Bedside and Verbal shift change report given to Cameron Steward (oncoming nurse) by Marleny Pal RN (offgoing nurse). Report included the following information SBAR, Kardex, and Recent Results.

## 2023-01-09 LAB
CREAT UR-MCNC: 37 MG/DL
PROT UR-MCNC: 6 MG/DL (ref 0–11.9)
PROT/CREAT UR-RTO: 0.2

## 2023-01-09 PROCEDURE — 3E033VJ INTRODUCTION OF OTHER HORMONE INTO PERIPHERAL VEIN, PERCUTANEOUS APPROACH: ICD-10-PCS | Performed by: OBSTETRICS & GYNECOLOGY

## 2023-01-09 PROCEDURE — 74011250636 HC RX REV CODE- 250/636: Performed by: OBSTETRICS & GYNECOLOGY

## 2023-01-09 PROCEDURE — 74011250637 HC RX REV CODE- 250/637: Performed by: OBSTETRICS & GYNECOLOGY

## 2023-01-09 PROCEDURE — 65270000029 HC RM PRIVATE

## 2023-01-09 PROCEDURE — 84156 ASSAY OF PROTEIN URINE: CPT

## 2023-01-09 PROCEDURE — 75410000002 HC LABOR FEE PER 1 HR

## 2023-01-09 RX ORDER — OXYTOCIN/RINGER'S LACTATE 30/500 ML
0-30 PLASTIC BAG, INJECTION (ML) INTRAVENOUS
Status: DISCONTINUED | OUTPATIENT
Start: 2023-01-09 | End: 2023-01-14 | Stop reason: HOSPADM

## 2023-01-09 RX ORDER — LABETALOL 200 MG/1
200 TABLET, FILM COATED ORAL 2 TIMES DAILY
Status: DISCONTINUED | OUTPATIENT
Start: 2023-01-09 | End: 2023-01-14 | Stop reason: HOSPADM

## 2023-01-09 RX ORDER — ACETAMINOPHEN 500 MG
1000 TABLET ORAL
Status: DISCONTINUED | OUTPATIENT
Start: 2023-01-09 | End: 2023-01-14 | Stop reason: HOSPADM

## 2023-01-09 RX ORDER — LABETALOL 200 MG/1
200 TABLET, FILM COATED ORAL 2 TIMES DAILY
Status: DISCONTINUED | OUTPATIENT
Start: 2023-01-09 | End: 2023-01-09

## 2023-01-09 RX ORDER — CALCIUM CARBONATE 200(500)MG
400 TABLET,CHEWABLE ORAL
Status: DISCONTINUED | OUTPATIENT
Start: 2023-01-09 | End: 2023-01-14 | Stop reason: HOSPADM

## 2023-01-09 RX ORDER — CALCIUM CARBONATE 200(500)MG
600 TABLET,CHEWABLE ORAL ONCE
Status: COMPLETED | OUTPATIENT
Start: 2023-01-09 | End: 2023-01-09

## 2023-01-09 RX ADMIN — ACETAMINOPHEN 1000 MG: 500 TABLET ORAL at 02:34

## 2023-01-09 RX ADMIN — LABETALOL HYDROCHLORIDE 200 MG: 200 TABLET, FILM COATED ORAL at 01:00

## 2023-01-09 RX ADMIN — LABETALOL HYDROCHLORIDE 200 MG: 200 TABLET, FILM COATED ORAL at 10:28

## 2023-01-09 RX ADMIN — CALCIUM CARBONATE (ANTACID) CHEW TAB 500 MG 400 MG: 500 CHEW TAB at 21:02

## 2023-01-09 RX ADMIN — CALCIUM CARBONATE (ANTACID) CHEW TAB 500 MG 600 MG: 500 CHEW TAB at 22:53

## 2023-01-09 RX ADMIN — LABETALOL HYDROCHLORIDE 200 MG: 200 TABLET, FILM COATED ORAL at 22:57

## 2023-01-09 RX ADMIN — Medication 25 MCG: at 00:30

## 2023-01-09 RX ADMIN — SODIUM CHLORIDE, POTASSIUM CHLORIDE, SODIUM LACTATE AND CALCIUM CHLORIDE 125 ML/HR: 600; 310; 30; 20 INJECTION, SOLUTION INTRAVENOUS at 09:35

## 2023-01-09 RX ADMIN — OXYTOCIN 1 MILLI-UNITS/MIN: 10 INJECTION, SOLUTION INTRAMUSCULAR; INTRAVENOUS at 10:27

## 2023-01-09 RX ADMIN — OXYTOCIN 22 MILLI-UNITS/MIN: 10 INJECTION, SOLUTION INTRAMUSCULAR; INTRAVENOUS at 18:05

## 2023-01-09 RX ADMIN — OXYTOCIN 25 MILLI-UNITS/MIN: 10 INJECTION, SOLUTION INTRAMUSCULAR; INTRAVENOUS at 19:52

## 2023-01-09 NOTE — PROGRESS NOTES
Labor Progress Note  Patient seen, fetal heart rate and contraction pattern evaluated. Pt comfortable, getting some rest. Carpal tunnel is the thing bothering her the most    VSS- last bp wnl, pitocin @ 6  Fetal Heart Rate: moderate variability  Accelerations: yes  Decelerations: none  Uterine Activity: Irregular  Cervical Exam: deferred  Membranes:  Intact    Assessment/Plan:  Reassuring fetal status.    CHTN - BPs are normal/mild, pr/cr 0.2  IOL - pt feeling some contractions, not painful  - cont pitocin

## 2023-01-09 NOTE — PROGRESS NOTES
2553 - Bedside shift change report given to Jax Luna RN (oncoming nurse) by Marcelino Hernandez RN (offgoing nurse). Report included the following information SBAR.  here for induction of labor due to Poly & GHTN. 2 doses of cytotec overnight. Romero balloon wasn't placed due to closed cervix. 26 - Dr Medina Mosher at bedside to assess, discussed starting pitocin or giving another dose of Cytotec. RN adjusted TOCO and agreed that we would monitor contractions a bit longer before determining the plan of care. V Scan performed at bedside to confirm vertex. Brisa Alvarado Notified Dr Medina Mosher of contraction frequency. Plan to give patient a break from monitoring, allow her to shower and eat breakfast, and encourage patient to ambulate. Then will start pitocin around 930 or so.      3859 - patient off monitor. Planning to shower. RN encouraged ambulation and walking after breakfast and shower. Patient agreeable to plan. 5977  - Patient back in room, on monitor. Notified Dr Medina Mosher. Verbal order received to start pitocin after NST. 1027 - Start Pitocin    1214 - RN at bedside, adjusting monitors    1057 - RN at bedside, adjusted monitors. 1111 - RN at bedside, adjusted monitors. 1133 - RN at bedside, adjusted monitors. Patient sleeping soundly. 1155 - RN at bedside, adjusted monitors. 1224 - Patient up to bathroom    1230 - Patient back to bed, Sitting up on edge of bed, eating lunch. 1250 - RN at bedside, adjusting monitors. 1315 - RN at bedside, Patient rating contractions 5/10 pain and uncomfortable with contractions. Adjusted monitors again. IV bolus    1348 - RN at bedside, adjusted monitors    1430 - RN at bedside, adjusting monitors    1455 - RN at bedside, adjusting monitors. [id number] - RN remained at bedside, adjusting monitors. Requested patient turn back to right side.

## 2023-01-09 NOTE — PROGRESS NOTES
1930 - Bedside and Verbal shift change report given to DORCAS Moncada RN (oncoming nurse) by LAKISHA Lemos (offgoing nurse). Report included the following information SBAR, MAR, and Recent Results. 0225 - patient complaining of headache, rating 6/10 for pain, denies visual disturbances, nausea and vomiting, BP WNL, will try Tylenol 1000mg.   0345 - Begin 2 hour monitor break, ok per Dr. Giovanni Garcia. 0730 - Bedside and Verbal shift change report given to DORCAS Jaimes RN (oncoming nurse) by DORCAS Moncada RN (offgoing nurse). Report included the following information SBAR, MAR, and Recent Results.

## 2023-01-09 NOTE — H&P
History & Physical    Name: Shanon Munoz MRN: [id number]  SSN: [id number]    YOB: 1980  Age: 38 y.o. Sex: female      Subjective:     Reason for Admission:  Pregnancy and Chronic Hypertension    History of Present Illness: Shanon Munoz is a 38 y.o.  female with an estimated gestational age of 42w2d with Estimated Date of Delivery: 23. Patient complains of  hYPERTENTION AND pOLYHYDRAMNIOS  for 2 weeks. Pregnancy has been complicated by advanced maternal age, chronic hypertension, and polyhydramnios. Patient denies abdominal pain  , chest pain, contractions, fever, headache , nausea and vomiting, pelvic pressure, right upper quadrant pain  , shortness of breath, swelling, vaginal bleeding , vaginal leaking of fluid , and visual disturbances. OB History          3    Para   2    Term           1    AB        Living   2         SAB        IAB        Ectopic        Molar        Multiple        Live Births                  Past Medical History:   Diagnosis Date    Dyslipidemia 2021    Essential hypertension      Past Surgical History:   Procedure Laterality Date    HX CHOLECYSTECTOMY  2021    HX LITHOTRIPSY  2016    HX OTHER SURGICAL       Social History     Occupational History    Not on file   Tobacco Use    Smoking status: Never    Smokeless tobacco: Never   Vaping Use    Vaping Use: Never used   Substance and Sexual Activity    Alcohol use: Yes    Drug use: Never    Sexual activity: Not on file     Family History   Problem Relation Age of Onset    Cancer Mother     Hypertension Father        No Known Allergies  Prior to Admission medications    Medication Sig Start Date End Date Taking? Authorizing Provider   aspirin 81 mg chewable tablet Take 81 mg by mouth daily. Yes Provider, Historical   prenatal no122/iron/folic acid (PRENATAL MULTI PO) Take  by mouth.    Yes Provider, Historical   labetaloL (NORMODYNE) 200 mg tablet Take 200 mg by mouth two (2) times a day.   Yes Provider, Historical        Review of Systems    Objective:     Vitals:    Vitals:    23 1550 23 1606 23 1622   BP: (!) 168/77  (!) 148/68   Pulse: 90  86   Resp:   18   Temp:   98.2 °F (36.8 °C)   Weight:  131.5 kg (290 lb)    Height:  5' 5\" (1.651 m)       Temp (24hrs), Av.2 °F (36.8 °C), Min:98.2 °F (36.8 °C), Max:98.2 °F (36.8 °C)    BP  Min: 148/68  Max: 168/77     Physical Exam    Cervical Exam: 0 cm dilated    50% effaced    -3 station    Presenting Part: cephalic  Cervical Position: posterior  Consistency: Medium  Uterine Activity: None  Membranes: Intact  Fetal Heart Rate: Baseline: 130 per minute  Variability: moderate  Accelerations: no  Decelerations: none  Uterine contractions: none       Labs:   Recent Results (from the past 24 hour(s))   CBC W/O DIFF    Collection Time: 23  4:54 PM   Result Value Ref Range    WBC 8.5 3.6 - 11.0 K/uL    RBC 4.11 3.80 - 5.20 M/uL    HGB 12.6 11.5 - 16.0 g/dL    HCT 35.8 35.0 - 47.0 %    MCV 87.1 80.0 - 99.0 FL    MCH 30.7 26.0 - 34.0 PG    MCHC 35.2 30.0 - 36.5 g/dL    RDW 13.8 11.5 - 14.5 %    PLATELET 942 118 - 443 K/uL    MPV 11.1 8.9 - 12.9 FL    NRBC 0.0 0  WBC    ABSOLUTE NRBC 0.00 0.00 - 4.87 K/uL   METABOLIC PANEL, COMPREHENSIVE    Collection Time: 23  4:54 PM   Result Value Ref Range    Sodium 140 136 - 145 mmol/L    Potassium 3.4 (L) 3.5 - 5.1 mmol/L    Chloride 108 97 - 108 mmol/L    CO2 23 21 - 32 mmol/L    Anion gap 9 5 - 15 mmol/L    Glucose 94 65 - 100 mg/dL    BUN 4 (L) 6 - 20 MG/DL    Creatinine 0.51 (L) 0.55 - 1.02 MG/DL    BUN/Creatinine ratio 8 (L) 12 - 20      eGFR >60 >60 ml/min/1.73m2    Calcium 8.8 8.5 - 10.1 MG/DL    Bilirubin, total 0.6 0.2 - 1.0 MG/DL    ALT (SGPT) 18 12 - 78 U/L    AST (SGOT) 33 15 - 37 U/L    Alk.  phosphatase 128 (H) 45 - 117 U/L    Protein, total 5.8 (L) 6.4 - 8.2 g/dL    Albumin 2.4 (L) 3.5 - 5.0 g/dL    Globulin 3.4 2.0 - 4.0 g/dL    A-G Ratio 0.7 (L) 1.1 - 2.2 Patient Active Problem List   Diagnosis Code    Extreme insulin resistance type B E88.81    Vitamin D deficiency E55.9    Stress incontinence N39.3    Snoring R06.83    Morbid obesity ([address] E66.01    Family history of malignant neoplasm of breast Z80.3    Dyslipidemia E78.5    Chronic hypertension complicating or reason for care during pregnancy, third trimester O10.913    Vaginal bleeding in pregnancy, third trimester O46.93    Chronic hypertension affecting pregnancy O10.919    Maternal obesity syndrome, antepartum, third trimester O99.213    Polyhydramnios affecting pregnancy in third trimester O40. 3XX0     As*sessment and Plan:   [id number] @ 40 w2d  Chronic Hypertension  Polyhydramnios  AMA  Admitted for IOL- unable to place Daryl Public Catheter, therefore PO Cytotec started for cervix ripening        Signed By:  Mirta Betancur MD     January 8, 2023                 radha

## 2023-01-09 NOTE — PROGRESS NOTES
Labor Progress Note  Patient seen, fetal heart rate and contraction pattern evaluated. Pt got some rest overnight, but not a lot due to carpal tunnel discomfort and difficulty monitoring baby. VSS- BPs mild, pitocin @ NA  Fetal Heart Rate: moderate variability  Accelerations: yes  Decelerations: none  Uterine Activity: Irregular  Cervical Exam: deferred  Membranes:  Intact    VSCAN - vertex, but head in LLQ    Assessment/Plan:  Reassuring fetal status. CHTN - on meds, BPs wnl  - labs wnl, no pr/cr, will order    IOL - pt was morgan too much overnight for next dose of cytotec, this am, will eval for contractions and see if cytotec or pitocin best for IOL. Will not try balloon again at this time, bc minimal cramping o/n per pt, and head not low enough to give resistance to facilitate placement.

## 2023-01-10 ENCOUNTER — ANESTHESIA (OUTPATIENT)
Dept: LABOR AND DELIVERY | Age: 43
End: 2023-01-10
Payer: COMMERCIAL

## 2023-01-10 ENCOUNTER — ANESTHESIA EVENT (OUTPATIENT)
Dept: LABOR AND DELIVERY | Age: 43
End: 2023-01-10
Payer: COMMERCIAL

## 2023-01-10 PROCEDURE — 74011250636 HC RX REV CODE- 250/636: Performed by: OBSTETRICS & GYNECOLOGY

## 2023-01-10 PROCEDURE — 74011000250 HC RX REV CODE- 250: Performed by: ANESTHESIOLOGY

## 2023-01-10 PROCEDURE — 10H07YZ INSERTION OF OTHER DEVICE INTO PRODUCTS OF CONCEPTION, VIA NATURAL OR ARTIFICIAL OPENING: ICD-10-PCS | Performed by: OBSTETRICS & GYNECOLOGY

## 2023-01-10 PROCEDURE — 74011000250 HC RX REV CODE- 250: Performed by: STUDENT IN AN ORGANIZED HEALTH CARE EDUCATION/TRAINING PROGRAM

## 2023-01-10 PROCEDURE — 65270000029 HC RM PRIVATE

## 2023-01-10 PROCEDURE — 75410000002 HC LABOR FEE PER 1 HR

## 2023-01-10 PROCEDURE — 74011250637 HC RX REV CODE- 250/637: Performed by: OBSTETRICS & GYNECOLOGY

## 2023-01-10 PROCEDURE — 74011250636 HC RX REV CODE- 250/636: Performed by: STUDENT IN AN ORGANIZED HEALTH CARE EDUCATION/TRAINING PROGRAM

## 2023-01-10 PROCEDURE — 77030019905 HC CATH URETH INTMIT MDII -A

## 2023-01-10 PROCEDURE — 00HU33Z INSERTION OF INFUSION DEVICE INTO SPINAL CANAL, PERCUTANEOUS APPROACH: ICD-10-PCS | Performed by: ANESTHESIOLOGY

## 2023-01-10 PROCEDURE — 77030009413 HC ELECTRD SCALP COVD -A

## 2023-01-10 PROCEDURE — 77030014125 HC TY EPDRL BBMI -B: Performed by: ANESTHESIOLOGY

## 2023-01-10 PROCEDURE — 74011000250 HC RX REV CODE- 250

## 2023-01-10 PROCEDURE — 74011250636 HC RX REV CODE- 250/636: Performed by: ANESTHESIOLOGY

## 2023-01-10 PROCEDURE — A4300 CATH IMPL VASC ACCESS PORTAL: HCPCS

## 2023-01-10 PROCEDURE — 77030028565 HC CATH CERV RIPNG BLN COOK -B

## 2023-01-10 PROCEDURE — 77030040830 HC CATH URETH FOL MDII -A

## 2023-01-10 PROCEDURE — 77030010848 HC CATH INTUTR PRSS KOLB -B

## 2023-01-10 RX ORDER — LIDOCAINE HYDROCHLORIDE AND EPINEPHRINE 15; 5 MG/ML; UG/ML
INJECTION, SOLUTION EPIDURAL AS NEEDED
Status: DISCONTINUED | OUTPATIENT
Start: 2023-01-10 | End: 2023-01-11 | Stop reason: HOSPADM

## 2023-01-10 RX ORDER — FENTANYL/BUPIVACAINE/NS/PF 2-1250MCG
PREFILLED PUMP RESERVOIR EPIDURAL
Status: COMPLETED
Start: 2023-01-10 | End: 2023-01-10

## 2023-01-10 RX ORDER — BUPIVACAINE HYDROCHLORIDE 5 MG/ML
INJECTION, SOLUTION EPIDURAL; INTRACAUDAL
Status: COMPLETED
Start: 2023-01-10 | End: 2023-01-10

## 2023-01-10 RX ORDER — FENTANYL CITRATE 50 UG/ML
INJECTION, SOLUTION INTRAMUSCULAR; INTRAVENOUS AS NEEDED
Status: DISCONTINUED | OUTPATIENT
Start: 2023-01-10 | End: 2023-01-11 | Stop reason: HOSPADM

## 2023-01-10 RX ORDER — BUPIVACAINE HYDROCHLORIDE 5 MG/ML
10 INJECTION, SOLUTION EPIDURAL; INTRACAUDAL AS NEEDED
Status: DISCONTINUED | OUTPATIENT
Start: 2023-01-10 | End: 2023-01-11 | Stop reason: HOSPADM

## 2023-01-10 RX ORDER — BUPIVACAINE HYDROCHLORIDE 5 MG/ML
INJECTION, SOLUTION EPIDURAL; INTRACAUDAL AS NEEDED
Status: DISCONTINUED | OUTPATIENT
Start: 2023-01-10 | End: 2023-01-11 | Stop reason: HOSPADM

## 2023-01-10 RX ORDER — FENTANYL CITRATE 50 UG/ML
100 INJECTION, SOLUTION INTRAMUSCULAR; INTRAVENOUS ONCE
Status: ACTIVE | OUTPATIENT
Start: 2023-01-10 | End: 2023-01-11

## 2023-01-10 RX ORDER — FENTANYL CITRATE 50 UG/ML
INJECTION, SOLUTION INTRAMUSCULAR; INTRAVENOUS
Status: COMPLETED
Start: 2023-01-10 | End: 2023-01-10

## 2023-01-10 RX ORDER — NALOXONE HYDROCHLORIDE 0.4 MG/ML
0.4 INJECTION, SOLUTION INTRAMUSCULAR; INTRAVENOUS; SUBCUTANEOUS AS NEEDED
Status: DISCONTINUED | OUTPATIENT
Start: 2023-01-10 | End: 2023-01-11 | Stop reason: HOSPADM

## 2023-01-10 RX ORDER — FENTANYL/BUPIVACAINE/NS/PF 2-1250MCG
1-16 PREFILLED PUMP RESERVOIR EPIDURAL CONTINUOUS
Status: DISCONTINUED | OUTPATIENT
Start: 2023-01-10 | End: 2023-01-11 | Stop reason: HOSPADM

## 2023-01-10 RX ORDER — NORETHINDRONE AND ETHINYL ESTRADIOL 0.5-0.035
10 KIT ORAL ONCE
Status: ACTIVE | OUTPATIENT
Start: 2023-01-10 | End: 2023-01-11

## 2023-01-10 RX ADMIN — NALBUPHINE HYDROCHLORIDE 10 MG: 10 INJECTION, SOLUTION INTRAMUSCULAR; INTRAVENOUS; SUBCUTANEOUS at 11:49

## 2023-01-10 RX ADMIN — SODIUM CHLORIDE, POTASSIUM CHLORIDE, SODIUM LACTATE AND CALCIUM CHLORIDE 75 ML/HR: 600; 310; 30; 20 INJECTION, SOLUTION INTRAVENOUS at 16:15

## 2023-01-10 RX ADMIN — Medication 10 ML/HR: at 16:55

## 2023-01-10 RX ADMIN — FAMOTIDINE 20 MG: 10 INJECTION, SOLUTION INTRAVENOUS at 20:27

## 2023-01-10 RX ADMIN — ONDANSETRON HYDROCHLORIDE 4 MG: 2 SOLUTION INTRAMUSCULAR; INTRAVENOUS at 18:34

## 2023-01-10 RX ADMIN — LIDOCAINE HYDROCHLORIDE,EPINEPHRINE BITARTRATE 4.5 ML: 15; .005 INJECTION, SOLUTION EPIDURAL; INFILTRATION; INTRACAUDAL; PERINEURAL at 16:36

## 2023-01-10 RX ADMIN — FENTANYL CITRATE 100 MCG: 50 INJECTION, SOLUTION INTRAMUSCULAR; INTRAVENOUS at 16:39

## 2023-01-10 RX ADMIN — SODIUM CHLORIDE, POTASSIUM CHLORIDE, SODIUM LACTATE AND CALCIUM CHLORIDE 75 ML/HR: 600; 310; 30; 20 INJECTION, SOLUTION INTRAVENOUS at 08:21

## 2023-01-10 RX ADMIN — BUPIVACAINE HYDROCHLORIDE 5 ML: 5 INJECTION, SOLUTION EPIDURAL; INTRACAUDAL; PERINEURAL at 16:39

## 2023-01-10 RX ADMIN — SODIUM CHLORIDE, POTASSIUM CHLORIDE, SODIUM LACTATE AND CALCIUM CHLORIDE 500 ML: 600; 310; 30; 20 INJECTION, SOLUTION INTRAVENOUS at 15:49

## 2023-01-10 RX ADMIN — OXYTOCIN 1 MILLI-UNITS/MIN: 10 INJECTION, SOLUTION INTRAMUSCULAR; INTRAVENOUS at 04:08

## 2023-01-10 RX ADMIN — LABETALOL HYDROCHLORIDE 200 MG: 200 TABLET, FILM COATED ORAL at 23:49

## 2023-01-10 RX ADMIN — NALBUPHINE HYDROCHLORIDE 10 MG: 10 INJECTION, SOLUTION INTRAMUSCULAR; INTRAVENOUS; SUBCUTANEOUS at 14:09

## 2023-01-10 NOTE — ANESTHESIA PROCEDURE NOTES
Epidural Block    Patient location during procedure: OB  Start time: 1/10/2023 4:27 PM  End time: 1/10/2023 4:40 PM  Reason for block: labor epidural  Staffing  Performed: attending   Anesthesiologist: Petrona Stuart MD  Preanesthetic Checklist  Completed: patient identified, IV checked, site marked, risks and benefits discussed, surgical consent, monitors and equipment checked, pre-op evaluation and timeout performed  Block Placement  Patient position: sitting  Prep: DuraPrep  Sterility prep: cap, drape, gloves and mask  Sedation level: no sedation  Patient monitoring: continuous pulse oximetry and heart rate  Approach: midline  Location: lumbar  Lumbar location: L3-L4  Epidural  Loss of resistance technique: air  Guidance: landmark technique  Needle  Needle type: Tuohy   Needle gauge: 17 G  Needle length: 9 cm  Needle insertion depth: 9 cm  Catheter type: end hole  Catheter size: 19 G  Catheter at skin depth: 14 cm  Catheter securement method: clear occlusive dressing and surgical tape  Test dose: negative  Assessment  Sensory level: T10  Block outcome: pain improved  Number of attempts: 1  Procedure assessment: patient tolerated procedure well with no immediate complications

## 2023-01-10 NOTE — ANESTHESIA PREPROCEDURE EVALUATION
Relevant Problems   No relevant active problems       Anesthetic History   No history of anesthetic complications            Review of Systems / Medical History  Patient summary reviewed, nursing notes reviewed and pertinent labs reviewed    Pulmonary  Within defined limits                 Neuro/Psych   Within defined limits           Cardiovascular    Hypertension: well controlled              Exercise tolerance: >4 METS     GI/Hepatic/Renal                Endo/Other    Diabetes: using insulin    Morbid obesity     Other Findings              Physical Exam    Airway  Mallampati: II  TM Distance: > 6 cm  Neck ROM: normal range of motion   Mouth opening: Normal     Cardiovascular  Regular rate and rhythm,  S1 and S2 normal,  no murmur, click, rub, or gallop             Dental  No notable dental hx       Pulmonary  Breath sounds clear to auscultation               Abdominal  GI exam deferred       Other Findings            Anesthetic Plan    ASA: 3  Anesthesia type: epidural            Anesthetic plan and risks discussed with: Patient

## 2023-01-10 NOTE — PROGRESS NOTES
0530 Bedside SBAR report received from [facility] Pt c/o IV burning. Assessment of IV site- infiltrated. IV pulled and IV restarted in left hand. Labs drawn- on hold at this time    [id number]- RN at bedside continuously adjusting toco to trace contractions    0842 Pt OOB BR- voided 300 cc    0850 Dr Iliana Desai at bedside. Plan of care discussed. Pt agrees to ripening balloon. Moved to labor bed to facilitate placement with pt in [facility]. 0900 Exam done- 3-4. Discussed options with patient. Pt agrees to AROM and internal monitoring    0905 AROM moderate clear fluid. IUPC and FSE placed    1530 bedside SBAR report to Eunice Frankel RN  1058 /56. Labetalol held at this time. Dr Yari Baird aware

## 2023-01-10 NOTE — PROGRESS NOTES
Labor Progress Note  Patient seen, fetal heart rate and contraction pattern evaluated, patient examined. Pit has been at 25 since about 730 PM  Patient Vitals for the past 1 hrs:   BP Pulse   01/09/23 2257 (!) 165/74 80       Physical Exam:  Cervical Exam:  FT/long/high, posterior, soft  Membranes:  Intact  Uterine Activity: every 2-3 minutes, mild, she doesn't feel them  Fetal Heart Rate: Baseline: 140 per minute  Variability: moderate  Accelerations: yes  Decelerations: none    Assessment/Plan:  Cervix not in a good position for [place]. Will do a pitocin break. If contractions space will try another dose of cyotec and then restart pitocin around 4 AM. Switched to hopsital bed so she can get some rest. Patient agreeable with plan.

## 2023-01-10 NOTE — PROGRESS NOTES
Bedside and Verbal shift change report given to BELLE Frederick RN (oncoming nurse) by ALANA Estveez RN (offgoing nurse). Report included the following information SBAR, Kardex, Intake/Output, MAR, Accordion, and Recent Results. 2232- Dr. Laisha Soler at bedside. 2234- Pt up to bathroom with portable monitor. 2241- SVE fingertip/50/-3 orders received to stop pitocin, given tums and restart pitocin at 0400.    2242- Pitocin stopped. Pt sitting on side of the bed.    2250- RN at bedside attempting to trace fetal monitoring. Fetal activity present. 2256- /74    2257- 200mg labetalol given. 0- Charge RN at bedside to assist in fetal monitoring. 2311- /66    0233- Pt up to bathroom. 0241- Pt back in bed RN attempt to adjust fetal monitor. 18- Charge RN at bedside for assistance with fetal monitor. 0408- Pitocin restarted. 0530- Bedside and Verbal shift change report given to MILLER Parker RN (oncoming nurse) by BELLE Frederick RN (offgoing nurse). Report included the following information SBAR, Kardex, Intake/Output, MAR, Accordion, Recent Results, and Med Rec Status.

## 2023-01-10 NOTE — PROGRESS NOTES
1545 - Bedside and Verbal shift change report given to VENU Jones RNC (oncoming nurse) by BELLE Ramirez RN (offgoing nurse). Report included the following information SBAR, Kardex, Procedure Summary, Intake/Output, MAR, Accordion, Recent Results, and Med Rec Status. [address] Marlen Live to request epidural.    1627 - Dr Marlen Live at bedside to place epidural.    1940 - Bedside and Verbal shift change report given to JOSEF Sierra RN (oncoming nurse) by Toya Estrada RNC (offgoing nurse). Report included the following information SBAR, Kardex, Procedure Summary, Intake/Output, MAR, Accordion, Recent Results, and Med Rec Status.

## 2023-01-11 PROCEDURE — 74011250637 HC RX REV CODE- 250/637: Performed by: OBSTETRICS & GYNECOLOGY

## 2023-01-11 PROCEDURE — 74011000250 HC RX REV CODE- 250: Performed by: ANESTHESIOLOGY

## 2023-01-11 PROCEDURE — 76010000392 HC C SECN EA ADDL 0.5 HR: Performed by: OBSTETRICS & GYNECOLOGY

## 2023-01-11 PROCEDURE — 65410000002 HC RM PRIVATE OB

## 2023-01-11 PROCEDURE — P9045 ALBUMIN (HUMAN), 5%, 250 ML: HCPCS | Performed by: NURSE ANESTHETIST, CERTIFIED REGISTERED

## 2023-01-11 PROCEDURE — 76010000391 HC C SECN FIRST 1 HR: Performed by: OBSTETRICS & GYNECOLOGY

## 2023-01-11 PROCEDURE — 74011000250 HC RX REV CODE- 250: Performed by: OBSTETRICS & GYNECOLOGY

## 2023-01-11 PROCEDURE — 74011250636 HC RX REV CODE- 250/636

## 2023-01-11 PROCEDURE — 75410000002 HC LABOR FEE PER 1 HR

## 2023-01-11 PROCEDURE — 74011250636 HC RX REV CODE- 250/636: Performed by: OBSTETRICS & GYNECOLOGY

## 2023-01-11 PROCEDURE — 75410000003 HC RECOV DEL/VAG/CSECN EA 0.5 HR: Performed by: OBSTETRICS & GYNECOLOGY

## 2023-01-11 PROCEDURE — 74011250636 HC RX REV CODE- 250/636: Performed by: STUDENT IN AN ORGANIZED HEALTH CARE EDUCATION/TRAINING PROGRAM

## 2023-01-11 PROCEDURE — 74011250636 HC RX REV CODE- 250/636: Performed by: NURSE ANESTHETIST, CERTIFIED REGISTERED

## 2023-01-11 PROCEDURE — 74011000258 HC RX REV CODE- 258: Performed by: OBSTETRICS & GYNECOLOGY

## 2023-01-11 PROCEDURE — 76060000078 HC EPIDURAL ANESTHESIA: Performed by: OBSTETRICS & GYNECOLOGY

## 2023-01-11 RX ORDER — OXYTOCIN/RINGER'S LACTATE 20/1000 ML
PLASTIC BAG, INJECTION (ML) INTRAVENOUS
Status: DISCONTINUED | OUTPATIENT
Start: 2023-01-11 | End: 2023-01-11 | Stop reason: HOSPADM

## 2023-01-11 RX ORDER — BISACODYL 5 MG
5 TABLET, DELAYED RELEASE (ENTERIC COATED) ORAL DAILY PRN
Status: DISCONTINUED | OUTPATIENT
Start: 2023-01-11 | End: 2023-01-14 | Stop reason: HOSPADM

## 2023-01-11 RX ORDER — PHENYLEPHRINE HCL IN 0.9% NACL 0.4MG/10ML
SYRINGE (ML) INTRAVENOUS AS NEEDED
Status: DISCONTINUED | OUTPATIENT
Start: 2023-01-11 | End: 2023-01-11 | Stop reason: HOSPADM

## 2023-01-11 RX ORDER — NALBUPHINE HYDROCHLORIDE 10 MG/ML
2.5 INJECTION, SOLUTION INTRAMUSCULAR; INTRAVENOUS; SUBCUTANEOUS
Status: DISCONTINUED | OUTPATIENT
Start: 2023-01-11 | End: 2023-01-14 | Stop reason: HOSPADM

## 2023-01-11 RX ORDER — LIDOCAINE HYDROCHLORIDE AND EPINEPHRINE 20; 5 MG/ML; UG/ML
INJECTION, SOLUTION EPIDURAL; INFILTRATION; INTRACAUDAL; PERINEURAL AS NEEDED
Status: DISCONTINUED | OUTPATIENT
Start: 2023-01-11 | End: 2023-01-11

## 2023-01-11 RX ORDER — LABETALOL HYDROCHLORIDE 5 MG/ML
20 INJECTION, SOLUTION INTRAVENOUS ONCE
Status: COMPLETED | OUTPATIENT
Start: 2023-01-11 | End: 2023-01-11

## 2023-01-11 RX ORDER — IBUPROFEN 400 MG/1
800 TABLET ORAL EVERY 8 HOURS
Status: DISCONTINUED | OUTPATIENT
Start: 2023-01-11 | End: 2023-01-14 | Stop reason: HOSPADM

## 2023-01-11 RX ORDER — KETOROLAC TROMETHAMINE 30 MG/ML
30 INJECTION, SOLUTION INTRAMUSCULAR; INTRAVENOUS
Status: DISPENSED | OUTPATIENT
Start: 2023-01-11 | End: 2023-01-12

## 2023-01-11 RX ORDER — ONDANSETRON 4 MG/1
4 TABLET, ORALLY DISINTEGRATING ORAL
Status: DISCONTINUED | OUTPATIENT
Start: 2023-01-11 | End: 2023-01-14 | Stop reason: HOSPADM

## 2023-01-11 RX ORDER — OXYCODONE AND ACETAMINOPHEN 5; 325 MG/1; MG/1
1 TABLET ORAL
Status: DISCONTINUED | OUTPATIENT
Start: 2023-01-11 | End: 2023-01-14 | Stop reason: HOSPADM

## 2023-01-11 RX ORDER — MORPHINE SULFATE 10 MG/ML
6 INJECTION, SOLUTION INTRAMUSCULAR; INTRAVENOUS
Status: DISCONTINUED | OUTPATIENT
Start: 2023-01-11 | End: 2023-01-14 | Stop reason: HOSPADM

## 2023-01-11 RX ORDER — ALBUMIN HUMAN 50 G/1000ML
SOLUTION INTRAVENOUS AS NEEDED
Status: DISCONTINUED | OUTPATIENT
Start: 2023-01-11 | End: 2023-01-11 | Stop reason: HOSPADM

## 2023-01-11 RX ORDER — CARBOPROST TROMETHAMINE 250 UG/ML
INJECTION, SOLUTION INTRAMUSCULAR
Status: DISPENSED
Start: 2023-01-11 | End: 2023-01-11

## 2023-01-11 RX ORDER — ONDANSETRON 2 MG/ML
4 INJECTION INTRAMUSCULAR; INTRAVENOUS
Status: DISCONTINUED | OUTPATIENT
Start: 2023-01-11 | End: 2023-01-14 | Stop reason: HOSPADM

## 2023-01-11 RX ORDER — LABETALOL HYDROCHLORIDE 5 MG/ML
INJECTION, SOLUTION INTRAVENOUS
Status: DISPENSED
Start: 2023-01-11 | End: 2023-01-11

## 2023-01-11 RX ORDER — ENOXAPARIN SODIUM 100 MG/ML
40 INJECTION SUBCUTANEOUS EVERY 24 HOURS
Status: DISCONTINUED | OUTPATIENT
Start: 2023-01-12 | End: 2023-01-14 | Stop reason: HOSPADM

## 2023-01-11 RX ORDER — NALOXONE HYDROCHLORIDE 0.4 MG/ML
0.4 INJECTION, SOLUTION INTRAMUSCULAR; INTRAVENOUS; SUBCUTANEOUS AS NEEDED
Status: DISCONTINUED | OUTPATIENT
Start: 2023-01-11 | End: 2023-01-14 | Stop reason: HOSPADM

## 2023-01-11 RX ORDER — OXYTOCIN/RINGER'S LACTATE 30/500 ML
10 PLASTIC BAG, INJECTION (ML) INTRAVENOUS AS NEEDED
Status: DISCONTINUED | OUTPATIENT
Start: 2023-01-11 | End: 2023-01-14 | Stop reason: HOSPADM

## 2023-01-11 RX ORDER — SODIUM CHLORIDE, SODIUM LACTATE, POTASSIUM CHLORIDE, CALCIUM CHLORIDE 600; 310; 30; 20 MG/100ML; MG/100ML; MG/100ML; MG/100ML
125 INJECTION, SOLUTION INTRAVENOUS CONTINUOUS
Status: DISCONTINUED | OUTPATIENT
Start: 2023-01-11 | End: 2023-01-14

## 2023-01-11 RX ORDER — SODIUM CHLORIDE, SODIUM LACTATE, POTASSIUM CHLORIDE, CALCIUM CHLORIDE 600; 310; 30; 20 MG/100ML; MG/100ML; MG/100ML; MG/100ML
INJECTION, SOLUTION INTRAVENOUS
Status: DISCONTINUED | OUTPATIENT
Start: 2023-01-11 | End: 2023-01-11 | Stop reason: HOSPADM

## 2023-01-11 RX ORDER — KETOROLAC TROMETHAMINE 30 MG/ML
INJECTION, SOLUTION INTRAMUSCULAR; INTRAVENOUS AS NEEDED
Status: DISCONTINUED | OUTPATIENT
Start: 2023-01-11 | End: 2023-01-11 | Stop reason: HOSPADM

## 2023-01-11 RX ORDER — OXYTOCIN/RINGER'S LACTATE 30/500 ML
87.3 PLASTIC BAG, INJECTION (ML) INTRAVENOUS AS NEEDED
Status: DISCONTINUED | OUTPATIENT
Start: 2023-01-11 | End: 2023-01-14 | Stop reason: HOSPADM

## 2023-01-11 RX ORDER — TERBUTALINE SULFATE 1 MG/ML
INJECTION SUBCUTANEOUS
Status: COMPLETED
Start: 2023-01-11 | End: 2023-01-11

## 2023-01-11 RX ORDER — MORPHINE SULFATE 0.5 MG/ML
INJECTION, SOLUTION EPIDURAL; INTRATHECAL; INTRAVENOUS AS NEEDED
Status: DISCONTINUED | OUTPATIENT
Start: 2023-01-11 | End: 2023-01-11 | Stop reason: HOSPADM

## 2023-01-11 RX ORDER — ACETAMINOPHEN 325 MG/1
650 TABLET ORAL
Status: DISCONTINUED | OUTPATIENT
Start: 2023-01-11 | End: 2023-01-14 | Stop reason: HOSPADM

## 2023-01-11 RX ORDER — SIMETHICONE 80 MG
80 TABLET,CHEWABLE ORAL AS NEEDED
Status: DISCONTINUED | OUTPATIENT
Start: 2023-01-11 | End: 2023-01-14 | Stop reason: HOSPADM

## 2023-01-11 RX ORDER — ONDANSETRON 2 MG/ML
INJECTION INTRAMUSCULAR; INTRAVENOUS AS NEEDED
Status: DISCONTINUED | OUTPATIENT
Start: 2023-01-11 | End: 2023-01-11 | Stop reason: HOSPADM

## 2023-01-11 RX ORDER — ZOLPIDEM TARTRATE 5 MG/1
5 TABLET ORAL
Status: DISCONTINUED | OUTPATIENT
Start: 2023-01-11 | End: 2023-01-14 | Stop reason: HOSPADM

## 2023-01-11 RX ORDER — CALCIUM CARBONATE 200(500)MG
1000 TABLET,CHEWABLE ORAL ONCE
Status: COMPLETED | OUTPATIENT
Start: 2023-01-11 | End: 2023-01-11

## 2023-01-11 RX ORDER — SODIUM CHLORIDE 0.9 % (FLUSH) 0.9 %
5-40 SYRINGE (ML) INJECTION EVERY 8 HOURS
Status: DISCONTINUED | OUTPATIENT
Start: 2023-01-11 | End: 2023-01-14

## 2023-01-11 RX ORDER — HYDROMORPHONE HYDROCHLORIDE 1 MG/ML
1 INJECTION, SOLUTION INTRAMUSCULAR; INTRAVENOUS; SUBCUTANEOUS
Status: DISCONTINUED | OUTPATIENT
Start: 2023-01-11 | End: 2023-01-14 | Stop reason: HOSPADM

## 2023-01-11 RX ORDER — TERBUTALINE SULFATE 1 MG/ML
0.25 INJECTION SUBCUTANEOUS
Status: COMPLETED | OUTPATIENT
Start: 2023-01-11 | End: 2023-01-11

## 2023-01-11 RX ORDER — MORPHINE SULFATE 10 MG/ML
10 INJECTION, SOLUTION INTRAMUSCULAR; INTRAVENOUS
Status: DISCONTINUED | OUTPATIENT
Start: 2023-01-11 | End: 2023-01-14 | Stop reason: HOSPADM

## 2023-01-11 RX ORDER — LIDOCAINE HYDROCHLORIDE AND EPINEPHRINE 20; 10 MG/ML; UG/ML
INJECTION, SOLUTION INFILTRATION; PERINEURAL AS NEEDED
Status: DISCONTINUED | OUTPATIENT
Start: 2023-01-11 | End: 2023-01-11 | Stop reason: HOSPADM

## 2023-01-11 RX ORDER — CARBOPROST TROMETHAMINE 250 UG/ML
INJECTION, SOLUTION INTRAMUSCULAR AS NEEDED
Status: DISCONTINUED | OUTPATIENT
Start: 2023-01-11 | End: 2023-01-11 | Stop reason: HOSPADM

## 2023-01-11 RX ORDER — PHENYLEPHRINE HCL IN 0.9% NACL 0.4MG/10ML
SYRINGE (ML) INTRAVENOUS
Status: DISCONTINUED | OUTPATIENT
Start: 2023-01-11 | End: 2023-01-11

## 2023-01-11 RX ORDER — SODIUM CHLORIDE 0.9 % (FLUSH) 0.9 %
5-40 SYRINGE (ML) INJECTION AS NEEDED
Status: DISCONTINUED | OUTPATIENT
Start: 2023-01-11 | End: 2023-01-14

## 2023-01-11 RX ORDER — DEXAMETHASONE SODIUM PHOSPHATE 4 MG/ML
INJECTION, SOLUTION INTRA-ARTICULAR; INTRALESIONAL; INTRAMUSCULAR; INTRAVENOUS; SOFT TISSUE AS NEEDED
Status: DISCONTINUED | OUTPATIENT
Start: 2023-01-11 | End: 2023-01-11 | Stop reason: HOSPADM

## 2023-01-11 RX ADMIN — OXYTOCIN 1 MILLI-UNITS/MIN: 10 INJECTION, SOLUTION INTRAMUSCULAR; INTRAVENOUS at 03:20

## 2023-01-11 RX ADMIN — SODIUM CHLORIDE 40 MCG/MIN: 9 INJECTION, SOLUTION INTRAVENOUS at 07:18

## 2023-01-11 RX ADMIN — LIDOCAINE HYDROCHLORIDE AND EPINEPHRINE 10 ML: 20; 10 INJECTION, SOLUTION INFILTRATION; PERINEURAL at 06:47

## 2023-01-11 RX ADMIN — ONDANSETRON HYDROCHLORIDE 4 MG: 2 SOLUTION INTRAMUSCULAR; INTRAVENOUS at 05:23

## 2023-01-11 RX ADMIN — Medication 120 MCG: at 07:36

## 2023-01-11 RX ADMIN — KETOROLAC TROMETHAMINE 30 MG: 30 INJECTION, SOLUTION INTRAMUSCULAR; INTRAVENOUS at 23:06

## 2023-01-11 RX ADMIN — TERBUTALINE SULFATE 0.25 MG: 1 INJECTION SUBCUTANEOUS at 05:15

## 2023-01-11 RX ADMIN — DEXAMETHASONE SODIUM PHOSPHATE 4 MG: 4 INJECTION, SOLUTION INTRAMUSCULAR; INTRAVENOUS at 07:17

## 2023-01-11 RX ADMIN — LABETALOL HYDROCHLORIDE 20 MG: 5 INJECTION INTRAVENOUS at 06:03

## 2023-01-11 RX ADMIN — KETOROLAC TROMETHAMINE 15 MG: 30 INJECTION, SOLUTION INTRAMUSCULAR; INTRAVENOUS at 08:18

## 2023-01-11 RX ADMIN — Medication 2 MG: at 07:45

## 2023-01-11 RX ADMIN — CALCIUM CARBONATE (ANTACID) CHEW TAB 500 MG 1000 MG: 500 CHEW TAB at 03:10

## 2023-01-11 RX ADMIN — LIDOCAINE HYDROCHLORIDE AND EPINEPHRINE 10 ML: 20; 10 INJECTION, SOLUTION INFILTRATION; PERINEURAL at 06:51

## 2023-01-11 RX ADMIN — Medication 10 ML/HR: at 00:27

## 2023-01-11 RX ADMIN — Medication 80 MCG: at 07:32

## 2023-01-11 RX ADMIN — Medication 3 G: at 07:03

## 2023-01-11 RX ADMIN — LABETALOL HYDROCHLORIDE 200 MG: 200 TABLET, FILM COATED ORAL at 23:06

## 2023-01-11 RX ADMIN — Medication 3 G: at 06:42

## 2023-01-11 RX ADMIN — KETOROLAC TROMETHAMINE 30 MG: 30 INJECTION, SOLUTION INTRAMUSCULAR; INTRAVENOUS at 17:32

## 2023-01-11 RX ADMIN — Medication 120 MCG: at 07:52

## 2023-01-11 RX ADMIN — Medication 909 ML/HR: at 07:22

## 2023-01-11 RX ADMIN — AZITHROMYCIN MONOHYDRATE 500 MG: 500 INJECTION, POWDER, LYOPHILIZED, FOR SOLUTION INTRAVENOUS at 07:07

## 2023-01-11 RX ADMIN — SODIUM CHLORIDE, POTASSIUM CHLORIDE, SODIUM LACTATE AND CALCIUM CHLORIDE: 600; 310; 30; 20 INJECTION, SOLUTION INTRAVENOUS at 06:49

## 2023-01-11 RX ADMIN — Medication 100 MCG: at 07:34

## 2023-01-11 RX ADMIN — Medication 1 MG: at 08:00

## 2023-01-11 RX ADMIN — ONDANSETRON HYDROCHLORIDE 4 MG: 2 SOLUTION INTRAMUSCULAR; INTRAVENOUS at 15:17

## 2023-01-11 RX ADMIN — SODIUM CHLORIDE, POTASSIUM CHLORIDE, SODIUM LACTATE AND CALCIUM CHLORIDE 125 ML/HR: 600; 310; 30; 20 INJECTION, SOLUTION INTRAVENOUS at 21:27

## 2023-01-11 RX ADMIN — ALBUMIN (HUMAN) 250 ML: 12.5 INJECTION, SOLUTION INTRAVENOUS at 07:37

## 2023-01-11 RX ADMIN — Medication 2 MG: at 07:49

## 2023-01-11 RX ADMIN — ONDANSETRON HYDROCHLORIDE 4 MG: 2 INJECTION, SOLUTION INTRAMUSCULAR; INTRAVENOUS at 06:58

## 2023-01-11 NOTE — L&D DELIVERY NOTE
Delivery Summary    Patient: Cassy Farias MRN: [id number]  SSN: [id number]    YOB: 1980  Age: 38 y.o. Sex: female        Information for the patient's : Margaret Kraft [[id number]]     Labor Events:    Labor: No    Steroids: None   Cervical Ripening Date/Time: 2023 8:08 PM   Cervical Ripening Type: Misoprostol   Antibiotics During Labor: No   Rupture Identifier:      Rupture Date/Time: 1/10/2023 9:05 AM   Rupture Type: AROM   Amniotic Fluid Volume: Moderate    Amniotic Fluid Description: Clear    Amniotic Fluid Odor:      Induction: Oxytocin       Induction Date/Time:        Indications for Induction: Hypertension; Other(comment)    Augmentation: None   Augmentation Date/Time:      Indications for Augmentation:     Labor complications: Fetal Intolerance; Failure to Progress in First Stage       Additional complications:        Delivery Events:  Indications For Episiotomy:     Episiotomy:     Perineal Laceration(s):     Repaired:     Periurethral Laceration Location:      Repaired:     Labial Laceration Location:     Repaired:     Sulcal Laceration Location:     Repaired:     Vaginal Laceration Location:     Repaired:     Cervical Laceration Location:     Repaired:     Repair Suture:     Number of Repair Packets:     Estimated Blood Loss (ml):  ml   Quantitaive Blood Loss (ml):             Delivery Date: 2023    Delivery Time: 7:21 AM   Delivery Type: , Low Transverse     Details    Trial of Labor: Yes   Primary/Repeat: Primary   Priority: Emergency   Indications:  Fetal Intolerance of Labor;Arrest of Descent       Sex:  Female     Gestational Age: 37w6d  Delivery Clinician:  Amberly Griffin  Living Status: Living   Delivery Location: L&D  OR            APGARS  One minute Five minutes Ten minutes   Skin color: 0   1        Heart rate: 2   2        Grimace: 1   2        Muscle tone: 1   2        Breathin   2        Totals: 6   9 Presentation: Vertex    Position:        Resuscitation Method:  Suctioning-bulb;Suctioning-deep; Tactile Stimulation     Meconium Stained: None      Cord Information:    Complications: None  Cord around:    Delayed cord clamping? Yes  Cord clamped date/time:2023  7:22 AM  Disposition of Cord Blood: Discard    Blood Gases Sent?: No    Placenta:  Date/Time: 2023  7:23 AM  Removal: Manual Removal      Appearance:        Measurements:  Birth Weight:        Birth Length:        Head Circumference:        Chest Circumference:       Abdominal Girth: Other Providers:   Shade PATEL;KYLE MOHAMUD;DISHA ROME;JASON KOWALSKI;KIMBERLY RODRIGUEZ;Casie HILL, Obstetrician;Primary Nurse;Primary [place] Nurse;Nicu Nurse;Nurse Practitioner;Nicu Nurse;Respiratory Therapist           Group B Strep:   Lab Results   Component Value Date/Time    Jamison, External negative 2022 12:00 AM     Information for the patient's : Jenn Aguirre [[id number]]   No results found for: ABORH, PCTABR, PCTDIG, BILI, ABORHEXT, ABORH   No results for input(s): PCO2CB, PO2CB, HCO3I, SO2I, IBD, PTEMPI, SPECTI, PHICB, ISITE, IDEV, IALLEN in the last 72 hours.

## 2023-01-11 NOTE — PROGRESS NOTES
1930: Bedside and Verbal shift change report given to JOSEF Humphreys RN (oncoming nurse) by Karlos Campos (offgoing nurse). Report included the following information SBAR, Intake/Output, MAR, and Recent Results. 2020Chidi Raymundo MD at bedside to replace FSE. SVE 6-7.     2025: Pt repositioned left side with peanut ball    2130: Pt repositioned right side with peanut ball    2230: Narinder Nicholson MD at bedside to assess labor progression. SVE 7/100/-1    2235: Pt repositioned left side lying with peanut ball    2350: Bedside and Verbal shift change report given to LORENA Nicholson RN (oncoming nurse) by JOSEF Humphreys RN (offgoing nurse). Report included the following information SBAR, Intake/Output, MAR, and Recent Results.

## 2023-01-11 NOTE — PROGRESS NOTES
In to reevaluate pt due to a mix of lates & variables with the restart of pitocin. Had to be turned off at pit of 1. FHT baseline 145/periods of min & mod variability/no accels  Cx unchanged    Reviewed with pt lack of cervical change and now fetal decels as indications for moving forward with  section. Reviewed risks to include but not be limited to bleeding, infection, injury to surrounding organs/baby, complications due to obesity, VTE risks, anticipated hospital course. Pt indicated understanding. Will proceed when ready.   More acute pt on the floor who may need sectioning first.

## 2023-01-11 NOTE — PROGRESS NOTES
2330 Report received from ISAIAH Sanchez RN to assume care of patient at this time. 2346 Right lateral w/ peanut ball    0145 Dr. Melecio Lane at bedside for evaluation and plan of care. 0148 SVE at this time by Dr. Majano Areas: 7-8/100/-1         Plan: pitocin break x 1-2 hrs; then restart    0150 Left lateral w/ peanut ball    0324 Right lateral w/ peanut ball    0350 Trendelenburg    0407 SVE at this time by Dr. Melecio Lane. Plan: CS    3088 CS delayed at this time; patient informed. 1482 Patient left lateral     0535 Patient right lateral    0745 Report given to VENU Huang RN to assume care of patient at this time.

## 2023-01-11 NOTE — DISCHARGE INSTRUCTIONS
Postpartum Support Groups  We know that all of us are dealing with a tremendous amount of uncertainty, confusion and disruption to our daily lives, which may result in increased anxiety, depression and fear. If you are feeling unsettled or worse, please know that we are here to help. During this time of increased caution and care for one another, [facility] [place] ([facility]) is offering virtual support groups to ALL MOTHERS in [place] regardless of the age of your child/children as a way to help weather this emotional storm together. Social support is an important part of self-care during this time of physical distancing. Virtual postpartum support group meetings available at www. postpartumva.org  Warm Line: 498.244.2846    Breastfeeding Support Groups    and  of each month at [facility]   and  of each month at [address] [place] [address] at [email or web address] under the \"About Us\" and \"Classes and Events tabs\"    POSTPARTUM DISCHARGE INSTRUCTIONS       Name:  Deidra Griffith  YOB: 1980  Admission Diagnosis:  Chronic hypertension affecting pregnancy [O10.919]  Polyhydramnios affecting pregnancy in third trimester [O40.3XX0]  Maternal obesity syndrome, antepartum, third trimester [O99.213]     Discharge Diagnosis:  [unfilled]  Attending Physician:  [unfilled]    Delivery Type:   Section: What to Expect at Home    Your Recovery:  A  section, or , is surgery to deliver your baby through a cut, called an incision that the doctor makes in your lower belly and uterus. You may have some pain in your lower belly and need pain medicine for 1 to 2 weeks. You can expect some vaginal bleeding for several weeks. You will probably need about 6 weeks to fully recover. It is important to take it easy while the incision is healing. Avoid heavy lifting, strenuous activities, or exercises that strain the belly muscles while you are recovering.  Ask a family member or friend for help with housework, cooking, and shopping. This care sheet gives you a general idea about how long it will take for you to recover. But each person recovers at a different pace. Follow the steps below to get better as quickly as possible. How can you care for yourself at home? Activity  Rest when you feel tired. Getting enough sleep will help you recover. Try to walk each day. Start by walking a little more than you did the day before. Bit by bit, increase the amount you walk. Walking boosts blood flow and helps prevent pneumonia, constipation, and blood clots. Avoid strenuous activities, such as bicycle riding, jogging, weightlifting, and aerobic exercise, for 6 weeks or until your doctor says it is okay. Until your doctor says it is okay, do not lift anything heavier than your baby. Do not do sit-ups or other exercise that strain the belly muscles for 6 weeks or until your doctor says it is okay. Hold a pillow over your incision when you cough or take deep breaths. This will support your belly and decrease your pain. You may shower as usual. Pat the incision dry when you are done. You will have some vaginal bleeding. Wear sanitary pads. Do not douche or use tampons until your doctor says it is okay. Ask your doctor when you can drive again. You will probably need to take at least 6 weeks off work. It depends on the type of work you do and how you feel. Wait until you are healed (about 4 to 6 weeks) before you have sexual intercourse. Your doctor will tell you when it is okay to have sex. Talk to your doctor about birth control. You can get pregnant even before your period returns. Also, you can get pregnant while you are breast-feeding. Incision care  Your skin is your body's first line of defense against germs, but an incision site leaves an easy way for germs to enter your body.  To prevent infection:  Clean your hands frequently and before and after changing any touching any dressings. If you have strips of tape on the incision, leave the tape on for a week or until it falls off. Look at your incision closely every day for any changes. Contact your doctor if you experience any signs of infection, such as fever or increased redness at the surgical site. Wash the area daily with warm, soapy water, and pat it dry. Don't use hydrogen peroxide or alcohol, which can slow healing. You may cover the area with a gauze bandage if it weeps or rubs against clothing. Change the bandage every day. Keep the area clean and dry. Diet  You can eat your normal diet. If your stomach is upset, try bland, low-fat foods like plain rice, broiled chicken, toast, and yogurt. Drink plenty of fluids (unless your doctor tells you not to). You may notice that your bowel movements are not regular right after your surgery. This is common. Try to avoid constipation and straining with bowel movements. You may want to take a fiber supplement every day. If you have not had a bowel movement after a couple of days, ask your doctor about taking a mild laxative. If you are breast-feeding, do not drink any alcohol. Medicines  Take pain medicines exactly as directed. If the doctor gave you a prescription medicine for pain, take it as prescribed. If you are not taking a prescription pain medicine, ask your doctor if you can take an over-the-counter medicine such as acetaminophen (Tylenol), ibuprofen (Advil, Motrin), or naproxen (Aleve), for cramps. Read and follow all instructions on the label. Do not take aspirin, because it can cause more bleeding. Do not take acetaminophen (Tylenol) and other acetaminophen containing medications (i.e. Percocet) at the same time. If you think your pain medicine is making you sick to your stomach: Take your medicine after meals (unless your doctor has told you not to). Ask your doctor for a different pain medicine.   If your doctor prescribed antibiotics, take them as directed. Do not stop taking them just because you feel better. You need to take the full course of antibiotics. Mental Health  Many women get the \"baby blues\" during the first few days after childbirth. You may lose sleep, feel irritable, and cry easily. You may feel happy one minute and sad the next. Hormone changes are one cause of these emotional changes. Also, the demands of a new baby, along with visits from relatives or other family needs, add to a mother's stress. The \"baby blues\" often peak around the fourth day. Then they ease up in less than 2 weeks. If your moodiness or anxiety lasts for more than 2 weeks, or if you feel like life is not worth living, you may have postpartum depression. This is different for each mother. Some mothers with serious depression may worry intensely about their infant's well-being. Others may feel distant from their child. Some mothers might even feel that they might harm their baby. A mother may have signs of paranoia, wondering if someone is watching her. With all the changes in your life, you may not know if you are depressed. Pregnancy sometimes causes changes in how you feel that are similar to the symptoms of depression. Symptoms of depression include:  Feeling sad or hopeless and losing interest in daily activities. These are the most common symptoms of depression. Sleeping too much or not enough. Feeling tired. You may feel as if you have no energy. Eating too much or too little. [employer] [employer] ([employer]) offers a Warm line; Chat with the Expert phone sessions; Information and Articles about Pregnancy and Postpartum Mood Disorders; Comprehensive List of Free Support Groups; Knowledgeable local coordinators who will offer support, information, and resources; Guide to Resources on [employer]; Calendar of events in the  mood disorders community; Latest News and Research; and [facility] [address] for [employer].  Remember - You are not alone; You are not to blame; With help, you will be well. 8-076-854-PPD(3173). WWW. POSTPARTUM. NET   Writing or talking about death, such as writing suicide notes or talking about guns, knives, or pills. Keep the numbers for these national suicide hotlines: 0-096-141-TALK (8-797.278.4731) and 2-820-HSINYVL (1-230.318.6038). If you or someone you know talks about suicide or feeling hopeless, get help right away. Other instructions  If you breast-feed your baby, you may be more comfortable while you are healing if you place the baby so that he or she is not resting on your belly. Try tucking your baby under your arm, with his or her body along the side you will be feeding on. Support your baby's upper body with your arm. With that hand you can control your baby's head to bring his or her mouth to your breast. This is sometimes called the football hold. Follow-up care is a key part of your treatment and safety. Be sure to make and go to all appointments, and call your doctor if you are having problems. It's also a good idea to know your test results and keep a list of the medicines you take. When should you call for help? Call 911 anytime you think you may need emergency care. For example, call if:  You are thinking of hurting yourself, your baby, or anyone else. You passed out (lost consciousness). You have symptoms of a blood clot in your lung (called a pulmonary embolism). These may include:  Sudden chest pain. Trouble breathing. Coughing up blood. Call your doctor now or seek immediate medical care if:    You have severe vaginal bleeding. You are soaking through a pad each hour for 2 or more hours. Your vaginal bleeding seems to be getting heavier or is still bright red 4 days after delivery. You are dizzy or lightheaded, or you feel like you may faint. You are vomiting or cannot keep fluids down. You have a fever. You have new or more belly pain.   You have loose stitches, or your incision comes open. You have symptoms of infection, such as: Increased pain, swelling, warmth, or redness. Red streaks leading from the incision. Pus draining from the incision. A fever  You pass tissue (not just blood). Your vaginal discharge smells bad. Your belly feels tender or full and hard. Your breasts are continuously painful or red. You feel sad, anxious, or hopeless for more than a few days. You have sudden, severe pain in your belly. You have symptoms of a blood clot in your leg (called a deep vein thrombosis), such as:  Pain in your calf, back of the knee, thigh, or groin. Redness and swelling in your leg or groin. You have symptoms of preeclampsia, such as:  Sudden swelling of your face, hands, or feet. New vision problems (such as dimness or blurring). A severe headache. Your blood pressure is higher than it should be or rises suddenly. You have new nausea or vomiting. Watch closely for changes in your health, and be sure to contact your doctor if you have any problems. Additional Information:  Learning About Hypertensive Disorders After Childbirth    What is preeclampsia? A woman with preeclampsia has blood pressure that is higher than usual. She may also have other serious symptoms. Preeclampsia can be dangerous. When it is severe, it can cause seizures (eclampsia) or liver or kidney damage. When the liver is affected, some women get HELLP syndrome, a blood-clotting and bleeding problem. HELLP can come on quickly and can be deadly. This is why your doctor checks you and your baby often. Preeclampsia usually occurs after 20 weeks of pregnancy. In rare cases, it is first noted right after childbirth. Most often, it starts near the end of pregnancy and goes away after childbirth. What are the symptoms? Mild preeclampsia usually doesn't cause symptoms. But preeclampsia can cause rapid weight gain and sudden swelling of the hands and face.     Severe preeclampsia does cause symptoms. It can cause a very bad headache and trouble seeing and breathing. It also can cause belly pain. You may also urinate less than usual.    If you have new preeclampsia symptoms after you go home from the hospital, call your doctor right away. What can you expect after you have had preeclampsia? In the hospital  After the baby and the placenta are delivered, preeclampsia usually starts to improve. Most women get better in the first few days after childbirth. After having preeclampsia, you still have a risk of seizures for a day or more after childbirth. (Very rarely, seizures happen later on.) So your doctor may have you take magnesium sulfate for a day or more to prevent seizures. You may also take medicine to lower your blood pressure. When you go home  Your blood pressure will most likely return to normal a few days after delivery. Your doctor will want to check your blood pressure sometime in the first week after you leave the hospital.    Some women still have high blood pressure 6 weeks after childbirth. But most return to normal levels over the long term. Take and record your blood pressure at home if your doctor tells you to. Learn the importance of the two measures of blood pressure (such as 120 over 80, or 120/80). The first number is the systolic pressure. This is the force of blood on the artery walls as the heart pumps. The second number is the diastolic pressure. This is the force of blood on the artery walls between heartbeats, when the heart is at rest. You have a choice of monitors to use. Manual monitor: You pump up the cuff and use a stethoscope to listen for your  Pulse. Electronic monitor: The cuff inflates, and a gauge shows your pulse rate. To take your blood pressure:  Ask your doctor to check your blood pressure monitor to be sure that it is accurate and that the cuff fits you.  Also ask your doctor to watch you use it, to make sure that you are using it right.  You should not eat, use tobacco products, or use medicine known to raise blood pressure (such as some nasal decongestant sprays) before you take your blood pressure. Avoid taking your blood pressure if you have just exercised or are nervous or upset. Rest at least 15 minutes before you take your blood pressure. Be safe with medicines. If you take medicine, take it exactly as prescribed. Call your doctor if you think you are having a problem with your medicine. Do not smoke. Quitting smoking will help lower your blood pressure and improve your baby's growth and health. If you need help quitting, talk to your doctor about stop-smoking programs and medicines. These can increase your chances of quitting for good. Eat a balanced and healthy diet that has lots of fruits and vegetables. Long-term health   After you have had preeclampsia, you have a higher-than-average risk of heart disease, stroke, and kidney disease. This may be because the same things that cause preeclampsia also cause heart and kidney disease. To protect your health, work with your doctor on living a heart-healthy lifestyle and getting the checkups you need. Your doctor may also want you to check your blood pressure at home. Follow-up care is a key part of your treatment and safety. Be sure to make and go to all appointments, and call your doctor if you are having problems. It's also a good idea to know your test results and keep a list of the medicines you take. These are general instructions for a healthy lifestyle:    No smoking/ No tobacco products/ Avoid exposure to second hand smoke    Surgeon General's Warning:  Quitting smoking now greatly reduces serious risk to your health.     Obesity, smoking, and sedentary lifestyle greatly increases your risk for illness    A healthy diet, regular physical exercise & weight monitoring are important for maintaining a healthy lifestyle    Recognize signs and symptoms of STROKE:    F-face looks uneven    A-arms unable to move or move unevenly    S-speech slurred or non-existent    T-time-call 911 as soon as signs and symptoms begin - DO NOT go       back to bed or wait to see if you get better - TIME IS BRAIN. I have had the opportunity to make my options or choices for discharge. I have received and understand these instructions.

## 2023-01-11 NOTE — PROGRESS NOTES
Late Entry from 2020    In to introduce myself to pt and support team.  Noted that pt's FSE was not tracing well and in to replace it  Easily replaced and FHTs in the 120s. Cx 6-7/100/-1-0 station. Pit was @ 26. Cont current management. Anticipate delivery soon.

## 2023-01-11 NOTE — OP NOTES
Operative Note    Patient: Jeny Cadet  YOB: 1980  MRN: [id number]    Date of Procedure: 2023     Pre-Op Diagnosis: Fetal Intolerance of Labor, arrest of dilation    Post-Op Diagnosis: Same as preoperative diagnosis. Procedure(s):   SECTION    Surgeon(s):  MD Verenice Lynch MD Seferino Endo, MD    Surgical Assistant: None    Anesthesia: Epidural     Estimated Blood Loss (mL):  [id number]    Complications: Bleeding    Specimens: * No specimens in log *     Implants: * No implants in log *    Drains: * No LDAs found *    ABX: ancef and flagyl    Uterotonics: hemabate    Findings: Normal uterus, ovaries, fallopian tubes. LFI, apgars 6 and 9    INDICATIONS: The patient was appropriately counseled, and once informed consent was obtained, she was taken to the operating room. PROCEDURE: The patient was taken to the operating room, her epidural was redosed by anesthesiology, and the patient was placed in the dorsal supine position with a left lateral tilt. She was prepped and draped in the usual sterile fashion. First portion of the procedure performed by Dr. Kay Garner. A Pfannenstiel skin incision was made 2 finger breadths above the pubic symphysis and taken down to the level of the fascia with a scalpel. The fascia was incised bilaterally on either side of the midline. The fascial incision was extended bilaterally with Hsieh scissors. The superior aspect of the fascia was grasped with Kocher clamps, tented up, and the rectus muscles were dissected off of the fascia bluntly and with the aid of hsieh scissors. Thereafter, the inferior aspect of the fascia was grasped with Kocher clamps and the rectus muscles were dissected off to the level of the pubic bone. The peritoneum was accessed bluntly with our fingers. The lower uterine segment was identified, noted to be clear of any adhesive disease.  Dr. Gideon Herrera then assumed care with Dr. Mena Wells as an assist.  Ange short retractor was placed. A bladder flap was created. The low transverse hysterotomy incision was made with a scalpel. The incision site was extended in a lateral/superior fashion. The infant was delivered using standard maneuvers. The cord was cut, and clamped, and the infant was handed off to the awaiting nursing attendants. The placenta was then expressed manuall and intact. A clean, dry lap pad was then used to clear the intrauterine cavity of any clot or debris. Extension on the right apex noted with bleeding, approx 3cm caudad. The apex was identified and clamped with an allis. This was then repaired with a 0 monocryl in a running locked fashion. Due to continued atony, hemabate was administered in addition to the standard pitocin. The hysterotomy was then closed with 0-monocryl in a running, locked fashion, followed by a second layer for imbrication. The right extension was noted to not be hemostatic, therefore additional sutures were placed. Finally a figure of 8 stitch with a 0 vicryl allowed for complete hemostasis. Surgicel applied. The uterine incision site was noted to be hemostatic. The deja retractor was then removed . The peritoneum was closed with a 3-0 monocryl in a running fashion. The fascia was identified, examined and noted to be intact without defects, and closed with 0 stratafix suture in a running manner. The subcutaneous spaced was closed with interrupted sutures given depth was >2cm with a 2-0 vicryl suture. The skin was closed with the insorb stapler. The patient's incision site was bandaged thereafter with a FUNMILAYO dressing. Sponge, lap, and instrument counts were correct x2. The patient was in good condition at the completion of the procedure and subsequently transferred to the recovery room.     Ludin Cali MD    Electronically Signed by Ludin Cali MD on 1/11/2023 at 8:35 AM

## 2023-01-11 NOTE — PROGRESS NOTES
0745~  Bedside and Verbal shift change report given to ARTHUR castorena RN (oncoming nurse) by Jessica Tadeo RN (offgoing nurse). Report included the following information SBAR, Intake/Output, MAR, and Recent Results. 1025~  Patient physically ready to be transferred. Family not ready, still gathering belongings and cleaning up their things. 1040~  Transfer to [facility].    1045~  TRANSFER - OUT REPORT:    Verbal report given to MARVIN Kaminski RN(name) on Romayne Russian  being transferred to (unit) for routine progression of care       Report consisted of patients Situation, Background, Assessment and   Recommendations(SBAR). Information from the following report(s) SBAR, Intake/Output, MAR, and Recent Results was reviewed with the receiving nurse. Lines:   Peripheral IV 01/10/23 Left;Posterior Hand (Active)   Site Assessment Clean, dry, & intact 01/10/23 2349   Phlebitis Assessment 0 01/10/23 2349   Infiltration Assessment 0 01/10/23 2349   Dressing Status Clean, dry, & intact 01/10/23 2349   Dressing Type Tape;Transparent 01/10/23 2349   Hub Color/Line Status Pink; Infusing 01/10/23 2349        Opportunity for questions and clarification was provided.       Patient transported with:   Registered Nurse

## 2023-01-11 NOTE — ROUTINE PROCESS
TRANSFER - IN REPORT:    Verbal report received from VENU Huang RN(name) on Calla Jarrett  being received from L&D(unit) for routine progression of care      Report consisted of patients Situation, Background, Assessment and   Recommendations(SBAR). Information from the following report(s) SBAR was reviewed with the receiving nurse. Opportunity for questions and clarification was provided. Assessment completed upon patients arrival to unit and care assumed.

## 2023-01-11 NOTE — PROGRESS NOTES
Pt is comfortable and able to sleep. Cx is only mildly changed at 7-8/100/-1. -150 despite pit @ 30    Discussed with pt. Will give a short pit break now, calcium and restart pit in one hour at a rate of 2. Optimistic a vaginal delivery can be achieved. If unable to get her either dilated more or at least to more adequate CTXs by 24 hour ROM (0900) will need to consider a .

## 2023-01-11 NOTE — PROGRESS NOTES
Pt reports feeling some vaginal pressure.   FHTs 120/mod variability/accels present/occ early & variable decels  IUPC q 4 minutes  Pit @ 28  Cx 7/100/-1 (no significant change)    MVU not adequate (140)  Will continue with current management

## 2023-01-11 NOTE — LACTATION NOTE
This note was copied from a baby's chart. Initial Lactation Consultation- Baby born  this morning to a  mom at 40 5/7 weeks gestation. Her older children are 24and 25years old. She said she did not breast feed this. Mom states baby latched earlier today. Mom is not feeling well this afternoon and not up with trying to breast feed at this time.

## 2023-01-11 NOTE — ANESTHESIA POSTPROCEDURE EVALUATION
Procedure(s):   SECTION. epidural    Anesthesia Post Evaluation      Multimodal analgesia: multimodal analgesia used between 6 hours prior to anesthesia start to PACU discharge  Patient location during evaluation: PACU  Level of consciousness: awake  Pain management: adequate  Airway patency: patent  Anesthetic complications: no  Cardiovascular status: acceptable  Respiratory status: acceptable  Hydration status: acceptable  Post anesthesia nausea and vomiting:  none  Final Post Anesthesia Temperature Assessment:  Normothermia (36.0-37.5 degrees C)      INITIAL Post-op Vital signs:   Vitals Value Taken Time   /57 23 0932   Temp 36.7 °C (98 °F) 23 0841   Pulse 91 23 0932   Resp 15 23 0841   SpO2 97 % 2331   Vitals shown include unvalidated device data.

## 2023-01-11 NOTE — PROGRESS NOTES
Labor Progress Note    S: Patient resting comfortably with epidural in place      O: Visit Vitals  BP (!) 112/56   Pulse 79   Temp 97.8 °F (36.6 °C)   Resp 16   Ht 5' 5\" (1.651 m)   Wt 131.5 kg (290 lb)   SpO2 96%   Breastfeeding No   BMI 48.26 kg/m²        Patient Vitals for the past 4 hrs: Mode Fetal Heart Rate Fetal Activity Variability Decelerations Accelerations RN Reviewed Strip?   01/10/23 1815 -- 125 -- -- -- -- Yes   01/10/23 1745 -- 125 -- -- -- -- Yes   01/10/23 1730 Internal 120 Present 6-25 BPM Variable No Yes   01/10/23 1715 -- 120 -- -- -- -- Yes   01/10/23 1700 Internal 125 Present 6-25 BPM None No Yes   01/10/23 1645 -- 115 -- -- -- -- Yes   01/10/23 1630 Internal 120 Present 6-25 BPM Variable Yes Yes   01/10/23 1615 -- 115 -- -- -- -- Yes   01/10/23 1600 Internal 120 Present 6-25 BPM Variable Yes Yes   01/10/23 1545 -- 125 -- -- -- -- Yes   01/10/23 1530 Internal 120 Present 6-25 BPM None Yes --   01/10/23 1515 -- 120 -- -- -- -- Yes       [place]: q 2-4 minutes      SVE: 5/80/-1      - Pitocin at 30, contractions still not adequate but has made change since earlier today   - Epidural in place working well   - Reviewed if no concern for maternal or fetal safety could give up to 18-24 hours s/p AROM to transition to active labor; typically would expect within this time frame. Reviewed indications for  delivery would be if no progress by tomorrow morning (which would be 24 hours from rupture) or if concern for fetal status or blood pressures overnight. Patient and partner in agreement with plan.      Ish Mccarthy MD

## 2023-01-12 LAB
BASOPHILS # BLD: 0.1 K/UL (ref 0–0.1)
BASOPHILS NFR BLD: 0 % (ref 0–1)
DIFFERENTIAL METHOD BLD: ABNORMAL
EOSINOPHIL # BLD: 0.1 K/UL (ref 0–0.4)
EOSINOPHIL NFR BLD: 1 % (ref 0–7)
ERYTHROCYTE [DISTWIDTH] IN BLOOD BY AUTOMATED COUNT: 13.9 % (ref 11.5–14.5)
HCT VFR BLD AUTO: 26.9 % (ref 35–47)
HGB BLD-MCNC: 9.3 G/DL (ref 11.5–16)
IMM GRANULOCYTES # BLD AUTO: 0.2 K/UL (ref 0–0.04)
IMM GRANULOCYTES NFR BLD AUTO: 1 % (ref 0–0.5)
LYMPHOCYTES # BLD: 2.6 K/UL (ref 0.8–3.5)
LYMPHOCYTES NFR BLD: 13 % (ref 12–49)
MCH RBC QN AUTO: 31.4 PG (ref 26–34)
MCHC RBC AUTO-ENTMCNC: 34.6 G/DL (ref 30–36.5)
MCV RBC AUTO: 90.9 FL (ref 80–99)
MONOCYTES # BLD: 1.3 K/UL (ref 0–1)
MONOCYTES NFR BLD: 7 % (ref 5–13)
NEUTS SEG # BLD: 15.5 K/UL (ref 1.8–8)
NEUTS SEG NFR BLD: 78 % (ref 32–75)
NRBC # BLD: 0 K/UL (ref 0–0.01)
NRBC BLD-RTO: 0 PER 100 WBC
PLATELET # BLD AUTO: 152 K/UL (ref 150–400)
PMV BLD AUTO: 11.7 FL (ref 8.9–12.9)
RBC # BLD AUTO: 2.96 M/UL (ref 3.8–5.2)
WBC # BLD AUTO: 19.8 K/UL (ref 3.6–11)

## 2023-01-12 PROCEDURE — 74011250637 HC RX REV CODE- 250/637: Performed by: OBSTETRICS & GYNECOLOGY

## 2023-01-12 PROCEDURE — 65410000002 HC RM PRIVATE OB

## 2023-01-12 PROCEDURE — 36415 COLL VENOUS BLD VENIPUNCTURE: CPT

## 2023-01-12 PROCEDURE — 85025 COMPLETE CBC W/AUTO DIFF WBC: CPT

## 2023-01-12 PROCEDURE — 74011250636 HC RX REV CODE- 250/636: Performed by: OBSTETRICS & GYNECOLOGY

## 2023-01-12 PROCEDURE — 74011250636 HC RX REV CODE- 250/636: Performed by: STUDENT IN AN ORGANIZED HEALTH CARE EDUCATION/TRAINING PROGRAM

## 2023-01-12 PROCEDURE — 74011000250 HC RX REV CODE- 250: Performed by: OBSTETRICS & GYNECOLOGY

## 2023-01-12 PROCEDURE — 74011250637 HC RX REV CODE- 250/637: Performed by: ADVANCED PRACTICE MIDWIFE

## 2023-01-12 RX ORDER — OXYCODONE AND ACETAMINOPHEN 5; 325 MG/1; MG/1
2 TABLET ORAL
Status: DISCONTINUED | OUTPATIENT
Start: 2023-01-12 | End: 2023-01-14 | Stop reason: HOSPADM

## 2023-01-12 RX ADMIN — ACETAMINOPHEN 650 MG: 325 TABLET ORAL at 15:17

## 2023-01-12 RX ADMIN — LABETALOL HYDROCHLORIDE 200 MG: 200 TABLET, FILM COATED ORAL at 23:12

## 2023-01-12 RX ADMIN — OXYCODONE AND ACETAMINOPHEN 2 TABLET: 5; 325 TABLET ORAL at 23:12

## 2023-01-12 RX ADMIN — OXYCODONE AND ACETAMINOPHEN 1 TABLET: 5; 325 TABLET ORAL at 09:50

## 2023-01-12 RX ADMIN — IBUPROFEN 800 MG: 400 TABLET, FILM COATED ORAL at 12:11

## 2023-01-12 RX ADMIN — ENOXAPARIN SODIUM 40 MG: 100 INJECTION SUBCUTANEOUS at 12:10

## 2023-01-12 RX ADMIN — SODIUM CHLORIDE, PRESERVATIVE FREE 10 ML: 5 INJECTION INTRAVENOUS at 05:21

## 2023-01-12 RX ADMIN — OXYCODONE AND ACETAMINOPHEN 1 TABLET: 5; 325 TABLET ORAL at 19:20

## 2023-01-12 RX ADMIN — LABETALOL HYDROCHLORIDE 200 MG: 200 TABLET, FILM COATED ORAL at 12:10

## 2023-01-12 RX ADMIN — IBUPROFEN 800 MG: 400 TABLET, FILM COATED ORAL at 20:16

## 2023-01-12 RX ADMIN — KETOROLAC TROMETHAMINE 30 MG: 30 INJECTION, SOLUTION INTRAMUSCULAR; INTRAVENOUS at 05:21

## 2023-01-12 NOTE — PROGRESS NOTES
Bedside and Verbal shift change report given to LORENA Brooks RN (oncoming nurse) by Lloyd Matthews RN (offgoing nurse). Report included the following information SBAR, Intake/Output, and MAR.

## 2023-01-12 NOTE — PROGRESS NOTES
Post-Operative  Day 1    Vero Powers     Assessment: Post-Op day 1, stable  CHTN - on labetalol 200mg BID  BMI 48 - FUNMILAYO dressing, and ppx lovenox until discharge  Acute blood loss anemia - hgb 12.6->9.3, will start Fe supplement on discharge    Plan:     - Routine post-operative care. - Postop hemoglobin stable. Plan to start Fe at discharge if pt anemic.  - Ambulate today. Information for the patient's : Santiago Labella [[id number]]   , Low Transverse   Patient doing well without significant complaint. Tolerating diet. Romero out. Ambulating. Vitals:  Visit Vitals  /77   Pulse 83   Temp 97.9 °F (36.6 °C)   Resp 18   Ht 5' 5\" (1.651 m)   Wt 131.5 kg (290 lb)   SpO2 97%   Breastfeeding Unknown   BMI 48.26 kg/m²     Temp (24hrs), Av.8 °F (36.6 °C), Min:97.5 °F (36.4 °C), Max:98.1 °F (36.7 °C)      Last 24hr Input/Output:    Intake/Output Summary (Last 24 hours) at 2023 1156  Last data filed at 2023 2020  Gross per 24 hour   Intake --   Output 475 ml   Net -475 ml          Exam:     Patient without distress. Fundus firm, nontender per nursing fundal checks. Incision bandaged, clean, dry, intact. Perineum with normal lochia noted per nursing assessment. Lower extremities are negative for pathological edema.     Labs:   Lab Results   Component Value Date/Time    WBC 19.8 (H) 2023 06:53 AM    WBC 8.5 2023 04:54 PM    WBC 8.8 2022 12:44 PM    WBC 8.0 2022 07:47 PM    WBC 8.1 2021 08:26 PM    HGB 9.3 (L) 2023 06:53 AM    HGB 12.6 2023 04:54 PM    HGB 13.1 2022 12:44 PM    HGB 14.0 2022 07:47 PM    HGB 15.7 2021 08:26 PM    HCT 26.9 (L) 2023 06:53 AM    HCT 35.8 2023 04:54 PM    HCT 37.0 2022 12:44 PM    HCT 41.2 2022 07:47 PM    HCT 48.0 (H) 2021 08:26 PM    PLATELET 322  06:53 AM    PLATELET 259  04:54 PM PLATELET 696 01/11/6059 12:44 PM    PLATELET 172 08/72/4469 07:47 PM    PLATELET 235 80/77/8858 08:26 PM       Recent Results (from the past 24 hour(s))   CBC WITH AUTOMATED DIFF    Collection Time: 01/12/23  6:53 AM   Result Value Ref Range    WBC 19.8 (H) 3.6 - 11.0 K/uL    RBC 2.96 (L) 3.80 - 5.20 M/uL    HGB 9.3 (L) 11.5 - 16.0 g/dL    HCT 26.9 (L) 35.0 - 47.0 %    MCV 90.9 80.0 - 99.0 FL    MCH 31.4 26.0 - 34.0 PG    MCHC 34.6 30.0 - 36.5 g/dL    RDW 13.9 11.5 - 14.5 %    PLATELET 698 995 - 019 K/uL    MPV 11.7 8.9 - 12.9 FL    NRBC 0.0 0  WBC    ABSOLUTE NRBC 0.00 0.00 - 0.01 K/uL    NEUTROPHILS 78 (H) 32 - 75 %    LYMPHOCYTES 13 12 - 49 %    MONOCYTES 7 5 - 13 %    EOSINOPHILS 1 0 - 7 %    BASOPHILS 0 0 - 1 %    IMMATURE GRANULOCYTES 1 (H) 0.0 - 0.5 %    ABS. NEUTROPHILS 15.5 (H) 1.8 - 8.0 K/UL    ABS. LYMPHOCYTES 2.6 0.8 - 3.5 K/UL    ABS. MONOCYTES 1.3 (H) 0.0 - 1.0 K/UL    ABS. EOSINOPHILS 0.1 0.0 - 0.4 K/UL    ABS. BASOPHILS 0.1 0.0 - 0.1 K/UL    ABS. IMM.  GRANS. 0.2 (H) 0.00 - 0.04 K/UL    DF AUTOMATED

## 2023-01-12 NOTE — PROGRESS NOTES
Bedside and Verbal shift change report given to DORCAS Marrero RN (oncoming nurse) by Lucy Brooks RN (offgoing nurse). Report included the following information SBAR.

## 2023-01-12 NOTE — LACTATION NOTE
[address]  1969, 46 y o  male MRN: [id number]  Unit/Bed#: ED 11 Encounter: [id number]  Primary Care Provider: Evi Pickering DO   Date and time admitted to hospital: 4/26/2021  3:52 PM    * Sepsis [facility])  Assessment & Plan  · POA  Evidenced by tachycardia and leukocytosis  Lactate is WNL  afebrile  · Tachycardia improving  · Source: LLE cellulitis, R/O Septic joint; consider non-infectious source such as RA flare  · Abx: Rocephin + Vanco -- will continue at this time  · Follow up blood and synovial fluid cultures  · Trend labs/vitals  · Eating/drinking well---can hold off on aggressive IVF    Cellulitis of left lower extremity  Assessment & Plan  · Likely 2/2 recent aspiration of L knee  · Indurated areas over lateral aspect of knee extending into thigh: US negative for abscess, but consistent w/ cellulitis   · Continue w/ IV abx: rocephin + Vanco   · Serial skin exams     Pain and swelling of left knee  Assessment & Plan  · Ongoing x2 weeks  S/p aspiration x1 on 4/16 w/ improvement in sx for about 2 days   Fluid culture negative for infection, received IM SoluMedrol x1 followed by prednisone 40mg PO QD x5 days w/ no improvement in sx   · S/p aspiration of 10 cc of clear synovial fluid in the ED-- f/u fluid culture   · ESR/CRP elevated-- has known RA-- L knee has previously been affected by flare  · US of soft tissue: no abscess, soft tissue edema consistent w/ cellulitis   · Continue w/ IV abx  · Pain control   · Ortho consult in AM   · Consider discussion w/ rheumatology     Rheumatoid arthritis [facility])  Assessment & Plan  · Patient follows w/ Rheumatology @ [facility]   · Continue plaquenil   · On humira-- has been on this for at least 1 year-- last dose was about 2 weeks ago prior to onset of sx   · Last Quant Gold >3 years ago-- recently on steroids, will update Quantiferon gold for completeness    Type 2 diabetes mellitus with diabetic neuropathic This note was copied from a baby's chart. Mom has been feeding infant formula since last night. At the time of my visit, infant drinking formula. Mom states she would like to try to breastfeed, but infant would not latch. Infant noted to have a tight frenulum, limiting tongue extension. With use of shield, infant took a few sucks, but was very fussy. Showed mom manual expression and provided infant with 2 ml of EBM via syringe. Suggested to mom that she pump if infant fails to latch and she desires to give infant breastmilk feedings. She has her own pump. arthropathy, with long-term current use of insulin (HCC)  Assessment & Plan  Lab Results   Component Value Date    HGBA1C 7 4 (H) 03/05/2021       No results for input(s): POCGLU in the last 72 hours  Blood Sugar Average: Last 72 hrs:  · glucose is not at goal   · Hold PO hypoglycemics: Januvia, Victoza  · Continue home long-acting regimen: lantus 50U SQ BID  · Add SSI for correction  · QID accuchecks    Benign essential hypertension  Assessment & Plan  · BP elevated on admission likely 2/2 pain; now improved   · Continue home BP medication regimen      VTE Prophylaxis: Enoxaparin (Lovenox)  / sequential compression device   Code Status: level 1  POLST: POLST form is not discussed and not completed at this time  Discussion with family: wife at bedside     Anticipated Length of Stay:  Patient will be admitted on an Inpatient basis with an anticipated length of stay of  > 2 midnights  Justification for Hospital Stay: tx and eval of cellulitis    Total Time for Visit, including Counseling / Coordination of Care: 30 minutes  Greater than 50% of this total time spent on direct patient counseling and coordination of care  Chief Complaint:   L knee pain/swelling/erythema    History of Present Illness:    Shana Kidd  is a 46 y o  male with PMH RA, DM 2 on insulin, HTN, presents to the ED for eval of L knee swelling/pain x2 weeks  Patient reports sudden onset of L Knee pain/swelling w/o trauma  He was concerned that he may be having an RA flare, so he went to his PCP  He had aspiration of fluid, and when it was negative for large WBC and he was started on steroids  He reports that after his 1st aspiration, his pain improved  This only lasted for about 2 days, and did not have improvement w/ steroids  Over the last few days, patient has had significant worsening of pain, swelling, and erythema  He has had swelling extending distally and proximally   He also reports that the side of his L thigh is tender and hard  Patient reports that his pain is severe, 8-9/10  He has pain w/ ambulation/flexion/extension, but has still been walking around  Patient notes that he has not had a RA flare in a long time  He is managed by rheumatology, is on weekly Humira (last dose 2 weeks ago)  He has known hx of prior MRSA infections and uncontrolled DM  Review of Systems:    Review of Systems   Constitutional: Negative  HENT: Negative  Eyes: Negative  Respiratory: Negative  Cardiovascular: Positive for leg swelling  Gastrointestinal: Negative  Genitourinary: Negative  Musculoskeletal: Positive for arthralgias, gait problem and joint swelling  Skin: Positive for color change  Hematological: Negative  Psychiatric/Behavioral: Negative          Past Medical and Surgical History:     Past Medical History:   Diagnosis Date    Arthritis     At risk for falls     Broken foot     right    Cataract     giacomo    [facility]ronic obstructive [facility] disease[facility] [address] [facility]     Diabetes mellitus ([facility]     Hyperlipidemia     Hypertension     Kidney stone     Neuropathy     Rheumatoid arthritis (HCC)     Seasonal allergies     Snores     Uses wheelchair     and crutches- NWB RLE    Wears glasses        Past Surgical History:   Procedure Laterality Date    APPENDECTOMY      CLOSED REDUCTION FOOT DISLOCATION Right 2/12/2019    Procedure: C/R FRACTURE;  Surgeon: Segundo Campbell DPM;  Location: [facility];  Service: Podiatry    COLONOSCOPY      FOOT SURGERY Right 07/2019    Removal of the bone graft and cleaned out infection, and placed new bone graft    NH EXC SKIN MALIG <0 5 CM REMAINDER BODY N/A 3/28/2018    Procedure: EXCISION WIDE LESION HEAD/FACIAL/NECK;  Surgeon: Sueellen Habermann, MD;  [facility];  Service: Surgical Oncology    NH GASTROCNEMIUS RECESSION Right 2/12/2019    Procedure: ENDO GASTROC RECESSION, APPLICATION OF EXTERNAL FIXATOR;  Surgeon: Segundo Campbell DPM;  [facility];  Service: Podiatry    IL REMOVE EXTERN BONE FIX DEV W ANESTH Right 4/18/2019    Procedure: Guru Stovall REMOVAL HARDWARE FOOT WITH APPLICATION OF GRAFT;  Surgeon: Mio Alas DPM;  [facility];  Service: Podiatry    SKIN BIOPSY      scalp    WISDOM TOOTH EXTRACTION  1998       Meds/Allergies:    Prior to Admission medications    Medication Sig Start Date End Date Taking?  Authorizing Provider   Accu-Chek FastClix Lancets MISC TEST BLOOD SUGAR FOUR TIMES A DAY BEFORE MEALS AND BEDTIME 3/29/21  Yes Mark Martinez, DO   Accu-Chek Guide test strip TEST BEFORE MEALS AND AT BEDTIME 3/29/21  Yes Mark Martinez, DO   albuterol (PROVENTIL HFA,VENTOLIN HFA) 90 mcg/act inhaler INHALE 2 PUFFS EVERY 4 (FOUR) HOURS AS NEEDED FOR WHEEZING 3/29/21  Yes Mark Martinez, DO   Ascorbic Acid (vitamin C) 1000 MG tablet Take 1,000 mg by mouth daily   Yes Historical Provider, MD   atorvastatin (LIPITOR) 40 mg tablet TAKE 1 TABLET AT BEDTIME  2/4/21  Yes Mark Martinez, DO   Basaglar KwikPen 100 units/mL injection pen INJECT 50 UNITS UNDER THE SKIN EVERY 12 (TWELVE) HOURS 4/23/21  Yes Mark Martinez, DO   BD Pen Needle Maureen U/F 32G X 4 MM MISC ONE PEN NEEDLE THREE TIMES A DAY 2-BASAGLAR 1- VICTOZA 2/4/21  Yes Mark Martinez, DO   calcium carbonate (OS-NERY) 600 MG tablet Take 600 mg by mouth daily Dinnertime   Yes Historical Provider, MD   cyclobenzaprine (FLEXERIL) 10 mg tablet TAKE 1 TABLET (10 MG) BY MOUTH 3 (THREE) TIMES A DAY AS NEEDED FOR MUSCLE SPASMS 3/29/21  Yes Mark Martinez, DO   ergocalciferol (VITAMIN D2) 50,000 units Take 50,000 Units by mouth once a week  12/27/18  Yes Historical Provider, MD   fluticasone (FLONASE) 50 mcg/act nasal spray USE 2 SPRAYS IN EACH NOSTRIL ONCE DAILY 1/4/21  Yes Mark Martinez, DO   HYDROcodone-acetaminophen (NORCO) 5-325 mg per tablet Take 1 tablet by mouth every 6 (six) hours as needed for painMax Daily Amount: 4 tablets 4/22/21  Yes Mark Martinez, DO   hydroxychloroquine (PLAQUENIL) 200 mg tablet Take 200 mg by mouth 2 (two) times a day   1/6/16  Yes Historical Provider, MD   Januvia 100 MG tablet TAKE ONE TABLET EVERY DAY 12/7/20  Yes Loida Plasencia DO   leflunomide (ARAVA) 20 MG tablet Take 1 tablet by mouth daily with dinner On hold for surgery 2/1/16  Yes Historical Provider, MD   losartan-hydrochlorothiazide (HYZAAR) 100-12 5 MG per tablet TAKE 1 TABLET BY MOUTH DAILY 4/23/21  Yes Loida Plasencia DO   naproxen (NAPROSYN) 500 mg tablet Take 1 tablet by mouth every 12 (twelve) hours 1/6/16  Yes Historical Provider, MD   pantoprazole (PROTONIX) 40 mg tablet Take 1 tablet (40 mg total) by mouth daily 1/15/21  Yes Rahel Chowdary MD   sucralfate (CARAFATE) 1 g tablet TAKE 1 TABLET (1 G TOTAL) BY MOUTH 4 (FOUR) TIMES A DAY 3/1/21  Yes Rahel Chowdary MD   ULTICARE MICRO PEN NEEDLES 32G X 4 MM MISC INJECT UNDER THE SKIN 3 (THREE) TIMES A DAY 8/7/19  Yes Loida Plasencia DO   Victoza injection INJECT 1 8 MG DAILY 2/4/21  Yes Loida Plasencia DO   Adalimumab (Humira Pen) 40 MG/0 4ML PNKT Humira(CF) Pen 40 mg/0 4 mL subcutaneous kit    Historical Provider, MD   clindamycin (CLEOCIN T) 1 % external solution Apply topically to scalp and beard twice a day  Patient not taking: Reported on 4/26/2021 5/19/20   Preston Nurse, MD   predniSONE 20 mg tablet Take 2 tablets (40 mg total) by mouth daily for 5 days  Patient not taking: Reported on 4/26/2021 4/22/21 4/27/21  Loida Plasencia DO     I have reviewed home medications with patient personally      Allergies: No Known Allergies    Social History:     Marital Status: /Civil Union   Occupation:   Patient Pre-hospital Living Situation: home w/ family  Patient Pre-hospital Level of Mobility: independent  Patient Pre-hospital Diet Restrictions: none  Substance Use History:   Social History     Substance and Sexual Activity   Alcohol Use Yes    Alcohol/week: 1 0 standard drinks    Types: 1 Cans of beer per week    Frequency: Monthly or less    Drinks per session: 1 or 2    Binge frequency: Never    Comment: beer     Social History     Tobacco Use   Smoking Status Current Every Day Smoker    Packs/day: 1 00    Years: 40 00    Pack years: 40 00    Types: Cigarettes   Smokeless Tobacco Never Used     Social History     Substance and Sexual Activity   Drug Use Yes    Types: Marijuana       Family History:    Family History   Problem Relation Age of Onset    Diabetes Mother     Stroke Mother     Mental illness Mother     Diabetes Father     Stroke Father     Alcohol abuse Brother     Coronary artery disease Family         Age 51-55    Diabetes type II Family     Heart disease Family        Physical Exam:     Vitals:   Blood Pressure: 140/86 (04/26/21 2022)  Pulse: 92 (04/26/21 2022)  Temperature: 99 1 °F (37 3 °C) (04/26/21 2022)  Temp Source: Oral (04/26/21 2022)  Respirations: 18 (04/26/21 2022)  Height: 6' (182 9 cm) (04/26/21 2022)  Weight - Scale: 117 kg (257 lb 4 4 oz) (04/26/21 2024)  SpO2: 98 % (04/26/21 2022)    Physical Exam  Constitutional:       Appearance: Normal appearance  HENT:      Head: Normocephalic  Mouth/Throat:      Mouth: Mucous membranes are moist    Eyes:      Pupils: Pupils are equal, round, and reactive to light  Cardiovascular:      Rate and Rhythm: Regular rhythm  Tachycardia present  Heart sounds: No murmur  No friction rub  No gallop  Pulmonary:      Effort: Pulmonary effort is normal       Breath sounds: Normal breath sounds  No wheezing  Abdominal:      General: Abdomen is flat  Palpations: Abdomen is soft  Tenderness: There is no abdominal tenderness  Musculoskeletal:         General: Swelling and tenderness (L knee) present  Left lower leg: Edema present  Skin:     General: Skin is dry  Findings: Erythema (erythema of L knee, extends laterally/proximally/distally/posteriorly) present  Neurological:      General: No focal deficit present        Mental Status: He is alert and oriented to person, place, and time    Psychiatric:         Mood and Affect: Mood normal        Additional Data:     Lab Results: I have personally reviewed pertinent reports  Results from last 7 days   Lab Units 04/26/21  1440   WBC Thousand/uL 21 66*   HEMOGLOBIN g/dL 15 7   HEMATOCRIT % 45 0   PLATELETS Thousands/uL 253   NEUTROS PCT % 90*   LYMPHS PCT % 4*   MONOS PCT % 5   EOS PCT % 0     Results from last 7 days   Lab Units 04/26/21  1440   SODIUM mmol/L 133*   POTASSIUM mmol/L 3 9   CHLORIDE mmol/L 96*   CO2 mmol/L 26   BUN mg/dL 24   CREATININE mg/dL 1 16   ANION GAP mmol/L 11   CALCIUM mg/dL 8 9   ALBUMIN g/dL 3 3*   TOTAL BILIRUBIN mg/dL 0 62   ALK PHOS U/L 76   ALT U/L 37   AST U/L 17   GLUCOSE RANDOM mg/dL 344*                 Results from last 7 days   Lab Units 04/26/21  1704   LACTIC ACID mmol/L 1 7       Imaging: I have personally reviewed pertinent reports  US extremity soft tissue   Final Result by Jennifer Chahal DO (04/26 1950)      Subcutaneous edema without discrete fluid collection to suggest the presence of abscess  Findings are suggestive of cellulitis in the appropriate clinical setting  Workstation performed: [id number]         XR knee 1 or 2 views left    (Results Pending)       EKG, Pathology, and Other Studies Reviewed on Admission:   · EKG: not available    [facility] Records Reviewed: Yes     ** Please Note: This note has been constructed using a voice recognition system   **

## 2023-01-12 NOTE — ROUTINE PROCESS
Bedside shift change report given to ALANA Woodall (oncoming nurse) by Dennis Saavedra. Allean Opitz, RN (offgoing nurse). Report included the following information SBAR.

## 2023-01-13 PROCEDURE — 74011250637 HC RX REV CODE- 250/637: Performed by: OBSTETRICS & GYNECOLOGY

## 2023-01-13 PROCEDURE — 74011250637 HC RX REV CODE- 250/637: Performed by: ADVANCED PRACTICE MIDWIFE

## 2023-01-13 PROCEDURE — 74011250636 HC RX REV CODE- 250/636: Performed by: OBSTETRICS & GYNECOLOGY

## 2023-01-13 PROCEDURE — 65410000002 HC RM PRIVATE OB

## 2023-01-13 RX ORDER — IBUPROFEN 600 MG/1
600 TABLET ORAL
Qty: 30 TABLET | Refills: 1 | Status: SHIPPED | OUTPATIENT
Start: 2023-01-13

## 2023-01-13 RX ORDER — OXYCODONE AND ACETAMINOPHEN 5; 325 MG/1; MG/1
1 TABLET ORAL
Qty: 12 TABLET | Refills: 0 | Status: SHIPPED | OUTPATIENT
Start: 2023-01-13 | End: 2023-01-16

## 2023-01-13 RX ADMIN — IBUPROFEN 800 MG: 400 TABLET, FILM COATED ORAL at 22:10

## 2023-01-13 RX ADMIN — SIMETHICONE 80 MG: 80 TABLET, CHEWABLE ORAL at 22:08

## 2023-01-13 RX ADMIN — OXYCODONE AND ACETAMINOPHEN 2 TABLET: 5; 325 TABLET ORAL at 08:02

## 2023-01-13 RX ADMIN — ENOXAPARIN SODIUM 40 MG: 100 INJECTION SUBCUTANEOUS at 12:19

## 2023-01-13 RX ADMIN — OXYCODONE AND ACETAMINOPHEN 2 TABLET: 5; 325 TABLET ORAL at 20:01

## 2023-01-13 RX ADMIN — IBUPROFEN 800 MG: 400 TABLET, FILM COATED ORAL at 03:48

## 2023-01-13 RX ADMIN — OXYCODONE AND ACETAMINOPHEN 2 TABLET: 5; 325 TABLET ORAL at 03:16

## 2023-01-13 RX ADMIN — LABETALOL HYDROCHLORIDE 200 MG: 200 TABLET, FILM COATED ORAL at 12:20

## 2023-01-13 RX ADMIN — IBUPROFEN 800 MG: 400 TABLET, FILM COATED ORAL at 12:19

## 2023-01-13 RX ADMIN — OXYCODONE AND ACETAMINOPHEN 2 TABLET: 5; 325 TABLET ORAL at 15:55

## 2023-01-13 RX ADMIN — BISACODYL 5 MG: 5 TABLET, COATED ORAL at 20:02

## 2023-01-13 RX ADMIN — LABETALOL HYDROCHLORIDE 200 MG: 200 TABLET, FILM COATED ORAL at 22:08

## 2023-01-13 NOTE — DISCHARGE SUMMARY
Obstetrical Discharge Summary     Name: Ignacio Cardoso MRN: [id number]  SSN: [id number]    YOB: 1980  Age: 38 y.o. Sex: female      Admit Date: 2023    Discharge Date: 2022    Admitting Physician: Kendall Manjarrez MD     Attending Physician:  Preet Carson MD     Admission Diagnoses: Chronic hypertension affecting pregnancy [O10.919]  Polyhydramnios affecting pregnancy in third trimester [O40.3XX0]  Maternal obesity syndrome, antepartum, third trimester [O99.213]    Discharge Diagnoses:   Information for the patient's : Santiago Labella [[id number]]   Delivery of a 3.725 kg female infant via , Low Transverse on 2023 at 7:21 AM  by Melinda Rounds. Apgars were 6  and 9 . Additional Diagnoses:   Hospital Problems  Date Reviewed: 9/15/2022            Codes Class Noted POA    Chronic hypertension affecting pregnancy ICD-10-CM: O10.919  ICD-9-CM: 642.00  2023 Unknown        Maternal obesity syndrome, antepartum, third trimester ICD-10-CM: O99.213  ICD-9-CM: 649.13, 278.00  2023 Unknown        Polyhydramnios affecting pregnancy in third trimester ICD-10-CM: O40. 3XX0  ICD-9-CM: 657.03  2023 Unknown          Lab Results   Component Value Date/Time    Rubella, External Immune 2022 12:00 AM    GrBStrep, External negative 2022 12:00 AM       Hospital Course: Normal hospital course following the delivery. Patient Instructions:   Current Discharge Medication List        START taking these medications    Details   ibuprofen (MOTRIN) 600 mg tablet Take 1 Tablet by mouth every six (6) hours as needed for Pain. Qty: 30 Tablet, Refills: 1      oxyCODONE-acetaminophen (PERCOCET) 5-325 mg per tablet Take 1 Tablet by mouth every six (6) hours as needed for Pain for up to 3 days. Max Daily Amount: 4 Tablets.   Qty: 12 Tablet, Refills: 0    Associated Diagnoses: Chronic hypertension complicating or reason for care during pregnancy, third trimester CONTINUE these medications which have NOT CHANGED    Details   prenatal no122/iron/folic acid (PRENATAL MULTI PO) Take  by mouth. labetaloL (NORMODYNE) 200 mg tablet Take 200 mg by mouth two (2) times a day. STOP taking these medications       aspirin 81 mg chewable tablet Comments:   Reason for Stopping:               Disposition at Discharge: Home or self care    Condition at Discharge: Stable    Reference my discharge instructions. Follow-up Appointments   Procedures    FOLLOW UP VISIT Appointment in: One Week BP check and FUNMILAYO removal     BP check and FUNMILAYO removal     Standing Status:   Standing     Number of Occurrences:   1     Order Specific Question:   Appointment in     Answer:    One Week        Signed By:  Riaz Sauceda MD     January 13, 2023

## 2023-01-13 NOTE — PROGRESS NOTES
Bedside and Verbal shift change report given to AVERY Vidal RN (oncoming nurse) by Evelyn Mills RN (offgoing nurse). Report included the following information SBAR, Kardex, Intake/Output, MAR, and Recent Results.

## 2023-01-13 NOTE — LACTATION NOTE
This note was copied from a baby's chart. Mom states she hasn't been able to get baby to nurse so she has been giving formula. She said the baby has not been tolerating the formula well. She is very fussy and spitting up. Mom wants to attempt to breast feed this morning. Mom has large nipples and baby is not able to maintain the latch directly on the breast. She has a nipple shield and I showed her how to use it. With some sweetease baby was able to maintain the latch. She nursed for 10 minutes on each breast with the shield with swallows heard on each side. I helped mom with hand expression after nursing and we were easily able to express drops from each that I spoon fed to the baby. I set mom up with hospital grade breast pump and showed her how to use it. Mom will attempt to feed at least every 3 hours using the shield. She will hand express and pump after nursing and will give the baby any breast milk collected. 80 - mom states baby is very fussy and looking hungry. She did not get anything when she pumped. Mom would like to try donor milk. Order received and consent singed. Baby took 20 cc's of donor milk. She spit a small amount of it up even after burping. 18 - mom said baby spit up some more. We discussed deep suctioning the baby. 1400 - baby deep suctioned for a large amount of old formula and clear fluid. I then helped mom get baby latched in the football hold with the nipple shield. We used a small amount of donor milk at the breast to get baby started. 18 - mom called out wanting assistance with the baby because she was spitting up again. Mom is concerned because baby has been very fussy and crying a lot. She said baby spit up around 4 this afternoon. Baby has been breast feeding with the shield and nursing better this afternoon. I have encouraged mom to burp the baby frequently and she has been but baby still spits up. Charge nurse notified.

## 2023-01-13 NOTE — PROGRESS NOTES
Post-Operative  Day 2    Carlota Gupta     Assessment: Post-Op day 2, stable  CHTN - on labetalol 200mg BID  BMI 48 - FUNMILAYO dressing, and ppx lovenox until discharge  Acute blood loss anemia - hgb 12.6->9.3, will start Fe supplement on discharge     Plan:     - Routine post-operative care. - Ambulate today. - Anticipate discharge home in AM.     Information for the patient's : Keerthi Butler [[id number]]   , Low Transverse   Patient doing well without significant complaint. Tolerating regular diet. Ambulating. Voiding without difficulty. Vitals:  Visit Vitals  /61 (BP 1 Location: Left arm, BP Patient Position: At rest)   Pulse 74   Temp 98.1 °F (36.7 °C)   Resp 18   Ht 5' 5\" (1.651 m)   Wt 131.5 kg (290 lb)   SpO2 96%   Breastfeeding Unknown   BMI 48.26 kg/m²     Temp (24hrs), Av.9 °F (36.6 °C), Min:97.7 °F (36.5 °C), Max:98.1 °F (36.7 °C)        Exam:       Patient without distress. Fundus firm, nontender per nursing fundal checks. Incision bandaged. Clean, dry, intact. Perineum with normal lochia noted per nursing assessment. Lower extremities are negative for pathological edema.     Labs:   Lab Results   Component Value Date/Time    WBC 19.8 (H) 2023 06:53 AM    WBC 8.5 2023 04:54 PM    WBC 8.8 2022 12:44 PM    WBC 8.0 2022 07:47 PM    WBC 8.1 2021 08:26 PM    HGB 9.3 (L) 2023 06:53 AM    HGB 12.6 2023 04:54 PM    HGB 13.1 2022 12:44 PM    HGB 14.0 2022 07:47 PM    HGB 15.7 2021 08:26 PM    HCT 26.9 (L) 2023 06:53 AM    HCT 35.8 2023 04:54 PM    HCT 37.0 2022 12:44 PM    HCT 41.2 2022 07:47 PM    HCT 48.0 (H) 2021 08:26 PM    PLATELET 025  06:53 AM    PLATELET 477  04:54 PM    PLATELET 331  12:44 PM    PLATELET 921  07:47 PM    PLATELET 607  08:26 PM       No results found for this or any previous visit (from the past 24 hour(s)).

## 2023-01-14 VITALS
BODY MASS INDEX: 48.32 KG/M2 | SYSTOLIC BLOOD PRESSURE: 137 MMHG | WEIGHT: 290 LBS | DIASTOLIC BLOOD PRESSURE: 84 MMHG | HEART RATE: 76 BPM | RESPIRATION RATE: 16 BRPM | HEIGHT: 65 IN | OXYGEN SATURATION: 98 % | TEMPERATURE: 98.8 F

## 2023-01-14 PROCEDURE — 74011250637 HC RX REV CODE- 250/637: Performed by: OBSTETRICS & GYNECOLOGY

## 2023-01-14 PROCEDURE — 74011250637 HC RX REV CODE- 250/637: Performed by: ADVANCED PRACTICE MIDWIFE

## 2023-01-14 RX ADMIN — OXYCODONE AND ACETAMINOPHEN 2 TABLET: 5; 325 TABLET ORAL at 14:14

## 2023-01-14 RX ADMIN — IBUPROFEN 800 MG: 400 TABLET, FILM COATED ORAL at 14:14

## 2023-01-14 RX ADMIN — IBUPROFEN 800 MG: 400 TABLET, FILM COATED ORAL at 06:26

## 2023-01-14 RX ADMIN — OXYCODONE AND ACETAMINOPHEN 1 TABLET: 5; 325 TABLET ORAL at 03:01

## 2023-01-14 RX ADMIN — LABETALOL HYDROCHLORIDE 200 MG: 200 TABLET, FILM COATED ORAL at 11:03

## 2023-01-14 RX ADMIN — OXYCODONE AND ACETAMINOPHEN 2 TABLET: 5; 325 TABLET ORAL at 07:17

## 2023-01-14 NOTE — ROUTINE PROCESS
Bedside shift change report given to AVERY Garza RN (oncoming nurse) by Portia De La Paz RN (offgoing nurse). Report included the following information SBAR and Kardex.

## 2023-01-14 NOTE — PROGRESS NOTES
Post-Operative  Day 3    Vero Gio       Assessment: Post-Op day 3, doing well    Plan:   - Discharge home today. - CHTN - on labetalol 200mg BID  - BMI 48 - FUNMILAYO dressing, and ppx lovenox until discharge  - Acute blood loss anemia - hgb 12.6->9.3, will start Fe supplement on discharge  - Follow up in office in 1 week(s) with [facility].  - Pain medication prescription(s) sent. - Questions answered. Information for the patient's : Meghan Escobar [[id number]]   , Low Transverse  Patient doing well without significant complaint. Tolerating regular diet. Ambulating. Voiding without difficulty. Vitals:  Visit Vitals  BP (!) 118/54 (BP 1 Location: Left upper arm, BP Patient Position: At rest)   Pulse 73   Temp 97.7 °F (36.5 °C)   Resp 18   Ht 5' 5\" (1.651 m)   Wt 131.5 kg (290 lb)   SpO2 98%   Breastfeeding Unknown   BMI 48.26 kg/m²     Temp (24hrs), Av °F (36.7 °C), Min:97.7 °F (36.5 °C), Max:98.6 °F (37 °C)        Exam:       Patient without distress. Fundus firm, nontender per nursing fundal checks. Incision bandaged. Clean, dry, intact. Perineum with normal lochia noted per nursing assessment. Lower extremities are negative for pathological edema.     Labs:   Lab Results   Component Value Date/Time    WBC 19.8 (H) 2023 06:53 AM    WBC 8.5 2023 04:54 PM    WBC 8.8 2022 12:44 PM    WBC 8.0 2022 07:47 PM    WBC 8.1 2021 08:26 PM    HGB 9.3 (L) 2023 06:53 AM    HGB 12.6 2023 04:54 PM    HGB 13.1 2022 12:44 PM    HGB 14.0 2022 07:47 PM    HGB 15.7 2021 08:26 PM    HCT 26.9 (L) 2023 06:53 AM    HCT 35.8 2023 04:54 PM    HCT 37.0 2022 12:44 PM    HCT 41.2 2022 07:47 PM    HCT 48.0 (H) 2021 08:26 PM    PLATELET 103  06:53 AM    PLATELET 367  04:54 PM    PLATELET 657  12:44 PM PLATELET 025 52/53/6536 07:47 PM    PLATELET 724 51/93/7024 08:26 PM       No results found for this or any previous visit (from the past 24 hour(s)).

## 2023-01-14 NOTE — LACTATION NOTE
This note was copied from a baby's chart. Lactation follow up- Baby has continued to be very fussy with some regurgitation. She has been nursing better with the shield per Mom and then supplementing with donor milk. Mom has not been regularly pumping pc and I encouraged her to do so to help establish milk supply until baby nursing more consistently. Wt loss now at 6.5% with 3 voids and 2 stools in last 24 hrs. Abd U/S done this AM to rule out pyloric stenosis. More GI testing to be done this AM due to symptoms. Baby was sleeping when I visited and Mom did not want to awaken at this time. I will assist Mom with latching at next feeding as needed. Baby will not be discharged today per Ped. All questions answered at this time.

## 2023-01-14 NOTE — ROUTINE PROCESS
0800 Received report from [place] using sbar format  [id number]  Discharge instructions given to patient and discussed  no further questions per patient   pt does not wish to fill out EPDS    pt to move into [facility] due to baby not being discharged today  inncare information given to patient and discussed    [facility]  pt aware to make appointment with her Ob in one week   pt signed [place] care sheet

## 2023-03-10 ENCOUNTER — VIRTUAL VISIT (OUTPATIENT)
Dept: FAMILY MEDICINE CLINIC | Age: 43
End: 2023-03-10

## 2023-03-10 DIAGNOSIS — J20.9 ACUTE BRONCHITIS, UNSPECIFIED ORGANISM: Primary | ICD-10-CM

## 2023-03-10 RX ORDER — BENZONATATE 100 MG/1
100 CAPSULE ORAL
Qty: 20 CAPSULE | Refills: 1 | Status: SHIPPED | OUTPATIENT
Start: 2023-03-10

## 2023-03-10 RX ORDER — AMOXICILLIN 875 MG/1
875 TABLET, FILM COATED ORAL 2 TIMES DAILY
Qty: 20 TABLET | Refills: 0 | Status: SHIPPED | OUTPATIENT
Start: 2023-03-10 | End: 2023-03-20

## 2023-03-10 NOTE — PROGRESS NOTES
Sarah Guzman, was evaluated through a synchronous (real-time) audio-video encounter. The patient (or guardian if applicable) is aware that this is a billable service, which includes applicable co-pays. This Virtual Visit was conducted with patient's (and/or legal guardian's) consent. The visit was conducted pursuant to the emergency declaration under the [facility], [address] [place] [address] authority and the [employer] and [employer] General Act. Patient identification was verified, and a caregiver was present when appropriate. The patient was located at: Home: [address] [place]  The provider was located at: Facility (Appt Department): [address] [place] [address] Dr Maya [facility] [phone]  Subjective:   HPI: Sarah Guzman was seen for Cold Symptoms    Zoya Christianson reports a 7 day history of cough, congestion, malaise with thick sputum production. She is afebrile. She has tried OTC meds without improvement. No Known Allergies    Current Outpatient Medications   Medication Sig    amoxicillin (AMOXIL) 875 mg tablet Take 1 Tablet by mouth two (2) times a day for 10 days. benzonatate (Tessalon Perles) 100 mg capsule Take 1 Capsule by mouth three (3) times daily as needed for Cough. ibuprofen (MOTRIN) 600 mg tablet Take 1 Tablet by mouth every six (6) hours as needed for Pain.    prenatal no122/iron/folic acid (PRENATAL MULTI PO) Take  by mouth. labetaloL (NORMODYNE) 200 mg tablet Take 200 mg by mouth two (2) times a day. No current facility-administered medications for this visit. Past Medical History:   Diagnosis Date    Dyslipidemia 04/13/2021    Essential hypertension        Family History   Problem Relation Age of Onset    Cancer Mother     Hypertension Father        ROS:  Denies fever, chills, + cough, chest pain, SOB,  nausea, vomiting, diarrhea, dysuria.  Denies rashes, wounds, arthralgias, weakness, numbness, visual changes, depression. Denies wt loss, wt gain, hemoptysis, hematochezia or melena. Patient is not experiencing chest pain radiating to the jaw and/or down the arms. PHYSICAL EXAMINATION:    Vital Signs: (As obtained by patient/caregiver at home)  There were no vitals taken for this visit. Constitutional: [x] Appears well-developed and well-nourished [x] No apparent distress      [] Abnormal -   Uncomfortable  Mental status: [x] Alert and awake  [x] Oriented to person/place/time [x] Able to follow commands    [] Abnormal -     Eyes:   EOM    [x]  Normal    [] Abnormal -   Sclera  [x]  Normal    [] Abnormal -          Discharge [x]  None visible   [] Abnormal -     HENT: [x] Normocephalic, atraumatic  [] Abnormal -   [x] Mouth/Throat: Mucous membranes are moist  Audible congestion  External Ears [x] Normal  [] Abnormal -    Neck: [x] No visualized mass [] Abnormal -     Pulmonary/Chest: [x] Respiratory effort normal   [x] No visualized signs of difficulty breathing or respiratory distress        [] Abnormal -      Musculoskeletal:   [x] Normal gait with no signs of ataxia         [x] Normal range of motion of neck        [] Abnormal -     Neurological:        [x] No Facial Asymmetry (Cranial nerve 7 motor function) (limited exam due to video visit)          [x] No gaze palsy        [] Abnormal -          Skin:        [x] No significant exanthematous lesions or discoloration noted on facial skin         [] Abnormal -            Psychiatric:       [x] Normal Affect [] Abnormal -        [x] No Hallucinations    Other pertinent observable physical exam findings:-        Assessment & Plan:       ICD-10-CM ICD-9-CM    1. Acute bronchitis, unspecified organism  J20.9 466.0 amoxicillin (AMOXIL) 875 mg tablet      benzonatate (Tessalon Perles) 100 mg capsule        Tx as above due to duration of symptoms. She is not pumping or nursing.  Increase fluids, rest, use OTC Tylenol and/or Motrin as directed on package insert for aches/fever. May use OTC antihistamines/decongestants as directed. Call if no improvement in 5-7 days. We discussed the expected course, resolution and complications of the diagnosis(es) in detail. Medication risks, benefits, costs, interactions, and alternatives were discussed as indicated. I advised her to contact the office if her condition worsens, changes or fails to improve as anticipated. She expressed understanding with the diagnosis(es) and plan. RTC PRN.     Billy Rosales NP

## 2023-03-10 NOTE — PATIENT INSTRUCTIONS
Upper Respiratory Infection (Cold): Care Instructions  Overview     An upper respiratory infection, or URI, is an infection of the nose, sinuses, or throat. URIs are spread by coughs, sneezes, and direct contact. The common cold is the most frequent kind of URI. The flu and sinus infections are other kinds of URIs. Almost all URIs are caused by viruses. Antibiotics won't cure them. But you can treat most infections with home care. This may include drinking lots of fluids and taking over-the-counter pain medicine. You will probably feel better in 4 to 10 days. Follow-up care is a key part of your treatment and safety. Be sure to make and go to all appointments, and call your doctor if you are having problems. It's also a good idea to know your test results and keep a list of the medicines you take. How can you care for yourself at home? To prevent dehydration, drink plenty of fluids. Choose water and other clear liquids until you feel better. If you have kidney, heart, or liver disease and have to limit fluids, talk with your doctor before you increase the amount of fluids you drink. Ask your doctor if you can take an over-the-counter pain medicine, such as acetaminophen (Tylenol), ibuprofen (Advil, Motrin), or naproxen (Aleve). Be safe with medicines. Read and follow all instructions on the label. No one younger than 20 should take aspirin. It has been linked to Reye syndrome, a serious illness. Be careful when taking over-the-counter cold or flu medicines and Tylenol at the same time. Many of these medicines have acetaminophen, which is Tylenol. Read the labels to make sure that you are not taking more than the recommended dose. Too much acetaminophen (Tylenol) can be harmful. Get plenty of rest.  Use saline (saltwater) nasal washes to help keep your nasal passages open and wash out mucus and allergens. You can buy saline nose sprays at a grocery store or drugstore.  Follow the instructions on the package. Or you can make your own at home. Add 1 teaspoon of non-iodized salt and 1 teaspoon of baking soda to 2 cups of distilled or boiled and cooled water. Fill a squeeze bottle or neti pot with the nasal wash. Then put the tip into your nostril, and lean over the sink. With your mouth open, gently squirt the liquid. Repeat on the other side. Use a vaporizer or humidifier to add moisture to your bedroom. Follow the instructions for cleaning the machine. Do not smoke or allow others to smoke around you. If you need help quitting, talk to your doctor about stop-smoking programs and medicines. These can increase your chances of quitting for good. When should you call for help? Call 911 anytime you think you may need emergency care. For example, call if:    You have severe trouble breathing. Call your doctor now or seek immediate medical care if:    You seem to be getting much sicker. You have new or worse trouble breathing. You have a new or higher fever. You have a new rash. Watch closely for changes in your health, and be sure to contact your doctor if:    You have a new symptom, such as a sore throat, an earache, or sinus pain. You cough more deeply or more often, especially if you notice more mucus or a change in the color of your mucus. You do not get better as expected. Where can you learn more? Go to http://www.gray.com/  Enter K520 in the search box to learn more about \"Upper Respiratory Infection (Cold): Care Instructions. \"  Current as of: February 9, 2022               Content Version: 13.4  © 2006-2022 [employer]. Care instructions adapted under license by [employer] (which disclaims liability or warranty for this information).  If you have questions about a medical condition or this instruction, always ask your healthcare professional. [place] [address] any warranty or liability for your use of this information.

## 2023-08-07 ENCOUNTER — TELEMEDICINE (OUTPATIENT)
Age: 43
End: 2023-08-07
Payer: COMMERCIAL

## 2023-08-07 DIAGNOSIS — E66.01 MORBID OBESITY (HCC): ICD-10-CM

## 2023-08-07 DIAGNOSIS — E78.5 DYSLIPIDEMIA: ICD-10-CM

## 2023-08-07 DIAGNOSIS — E88.81: ICD-10-CM

## 2023-08-07 DIAGNOSIS — I10 PRIMARY HYPERTENSION: Primary | ICD-10-CM

## 2023-08-07 PROCEDURE — 99214 OFFICE O/P EST MOD 30 MIN: CPT | Performed by: NURSE PRACTITIONER

## 2023-08-07 RX ORDER — BUPROPION HYDROCHLORIDE 100 MG/1
TABLET ORAL
Qty: 60 TABLET | Refills: 3 | Status: SHIPPED | OUTPATIENT
Start: 2023-08-07

## 2023-08-07 RX ORDER — FUROSEMIDE 20 MG/1
20 TABLET ORAL DAILY PRN
Qty: 30 TABLET | Refills: 0 | Status: SHIPPED | OUTPATIENT
Start: 2023-08-07

## 2023-08-07 RX ORDER — NALTREXONE HYDROCHLORIDE 50 MG/1
TABLET, FILM COATED ORAL
Qty: 30 TABLET | Refills: 5 | Status: SHIPPED | OUTPATIENT
Start: 2023-08-07

## 2023-08-07 SDOH — ECONOMIC STABILITY: HOUSING INSECURITY
IN THE LAST 12 MONTHS, WAS THERE A TIME WHEN YOU DID NOT HAVE A STEADY PLACE TO SLEEP OR SLEPT IN A SHELTER (INCLUDING NOW)?: NO

## 2023-08-07 SDOH — ECONOMIC STABILITY: FOOD INSECURITY: WITHIN THE PAST 12 MONTHS, THE FOOD YOU BOUGHT JUST DIDN'T LAST AND YOU DIDN'T HAVE MONEY TO GET MORE.: NEVER TRUE

## 2023-08-07 SDOH — ECONOMIC STABILITY: INCOME INSECURITY: HOW HARD IS IT FOR YOU TO PAY FOR THE VERY BASICS LIKE FOOD, HOUSING, MEDICAL CARE, AND HEATING?: NOT HARD AT ALL

## 2023-08-07 SDOH — ECONOMIC STABILITY: FOOD INSECURITY: WITHIN THE PAST 12 MONTHS, YOU WORRIED THAT YOUR FOOD WOULD RUN OUT BEFORE YOU GOT MONEY TO BUY MORE.: NEVER TRUE

## 2023-08-07 NOTE — PROGRESS NOTES
1. \"Have you been to the ER, urgent care clinic since your last visit? Hospitalized since your last visit? \" Yes When: 7 months ago [facility] Had a baby     2. \"Have you seen or consulted any other health care providers outside of the [address] [place] [address] since your last visit? \" No     3. For patients aged 43-73: Has the patient had a colonoscopy / FIT/ Cologuard? NA - based on age     If the patient is female:    4. For patients aged 43-66: Has the patient had a mammogram within the past 2 years? Yes - no Care Gap present    5. For patients aged 21-65: Has the patient had a pap smear? Yes - no Care Gap present    Chief Complaint   Patient presents with    Weight Loss       There were no vitals filed for this visit.

## 2023-08-10 NOTE — PROGRESS NOTES
Young Rocha (:  1980) is a Established patient, presenting virtually for evaluation of the following:    HPI: Ms Kerri Chen endorses since having her baby 7 months ago she is having difficulty losing weight. She has fluid retention up to 40 lbs. She also endorses difficulty with healthy diet and exercise. We discussed individualizing strategies to help with diet ,exercise , fluid retention and HTN. She requests a refill fluid pill lasix to help with water retention. We discussed the importance of potassium. Assessment & Plan   Below is the assessment and plan developed based on review of pertinent history, physical exam, labs, studies, and medications. 1. Primary hypertension  -     furosemide (LASIX) 20 MG tablet; Take 1 tablet by mouth daily as needed (edema), Disp-30 tablet, R-0Normal    2. Morbid obesity ([address])  Heart healthy diet and gradually increase exercise to 60 minutes a day. First goal 10 to 15 minutes of movement after each meal and a brief walk in the evening. 3. Dyslipidemia  Heart healthy diet and gradually increase exercise to 60 minutes a day. First goal 10 to 15 minutes of movement after each meal and a brief walk in the evening. 4. Extreme insulin resistance type B  We discussed starting with managing the blood pressure and fluid retention. Nex address weight loss and insulin resistance. We discussed the importance of heart healthy lifestyle (diet and exercise). No follow-ups on file.        Subjective   HPI  Review of Systems       Objective   Patient-Reported Vitals  No data recorded     Physical Exam      Constitutional: [x] Appears well-developed and well-nourished [x] No apparent distress      [] Abnormal -     Mental status: [x] Alert and awake  [x] Oriented to person/place/time [x] Able to follow commands    [] Abnormal -     Eyes:   EOM    [x]  Normal    [] Abnormal -   Sclera  [x]  Normal    [] Abnormal -          Discharge [x]  None visible   [] Abnormal -

## 2023-09-14 ENCOUNTER — TELEMEDICINE (OUTPATIENT)
Age: 43
End: 2023-09-14
Payer: COMMERCIAL

## 2023-09-14 DIAGNOSIS — R09.89 CHEST CONGESTION: ICD-10-CM

## 2023-09-14 DIAGNOSIS — Z20.822 COUGH WITH EXPOSURE TO COVID-19 VIRUS: Primary | ICD-10-CM

## 2023-09-14 DIAGNOSIS — R05.8 COUGH WITH EXPOSURE TO COVID-19 VIRUS: Primary | ICD-10-CM

## 2023-09-14 PROCEDURE — 99214 OFFICE O/P EST MOD 30 MIN: CPT | Performed by: NURSE PRACTITIONER

## 2023-09-14 RX ORDER — METHYLPREDNISOLONE 4 MG/1
TABLET ORAL
Qty: 1 KIT | Refills: 0 | Status: SHIPPED | OUTPATIENT
Start: 2023-09-14 | End: 2023-09-20

## 2023-09-14 RX ORDER — AZITHROMYCIN 250 MG/1
250 TABLET, FILM COATED ORAL SEE ADMIN INSTRUCTIONS
Qty: 6 TABLET | Refills: 0 | Status: SHIPPED | OUTPATIENT
Start: 2023-09-14 | End: 2023-09-19

## 2023-09-14 ASSESSMENT — PATIENT HEALTH QUESTIONNAIRE - PHQ9
SUM OF ALL RESPONSES TO PHQ QUESTIONS 1-9: 0
1. LITTLE INTEREST OR PLEASURE IN DOING THINGS: 0
2. FEELING DOWN, DEPRESSED OR HOPELESS: 0
SUM OF ALL RESPONSES TO PHQ QUESTIONS 1-9: 0
SUM OF ALL RESPONSES TO PHQ9 QUESTIONS 1 & 2: 0

## 2023-12-15 RX ORDER — BUPROPION HYDROCHLORIDE 100 MG/1
TABLET ORAL
Qty: 360 TABLET | Refills: 3 | Status: SHIPPED | OUTPATIENT
Start: 2023-12-15

## 2023-12-15 NOTE — TELEPHONE ENCOUNTER
Patient requesting refill on     Requested Prescriptions     Pending Prescriptions Disp Refills    naltrexone (DEPADE) 50 MG tablet 30 tablet 5     Sig: Take 1/4 tablet by mouth in the am for 2 weeks,  Then increase to 1/2 tablet by mouth twice a day maintenance dose.         Last OV 3/10/2023

## 2023-12-16 RX ORDER — NALTREXONE HYDROCHLORIDE 50 MG/1
TABLET, FILM COATED ORAL
Qty: 30 TABLET | Refills: 5 | Status: SHIPPED | OUTPATIENT
Start: 2023-12-16

## 2024-01-30 ENCOUNTER — TELEMEDICINE (OUTPATIENT)
Age: 44
End: 2024-01-30
Payer: COMMERCIAL

## 2024-01-30 DIAGNOSIS — R68.89 FLU-LIKE SYMPTOMS: Primary | ICD-10-CM

## 2024-01-30 LAB
EXP DATE SOLUTION: NORMAL
EXP DATE SWAB: NORMAL
EXPIRATION DATE: NORMAL
INFLUENZA A ANTIGEN, POC: NEGATIVE
INFLUENZA B ANTIGEN, POC: NEGATIVE
LOT NUMBER POC: NORMAL
LOT NUMBER SOLUTION: NORMAL
LOT NUMBER SWAB: NORMAL
RSV RNA: NEGATIVE
SARS-COV-2 RNA, POC: NEGATIVE
STREP PYOGENES DNA, POC: NEGATIVE
VALID INTERNAL CONTROL, POC: YES
VALID INTERNAL CONTROL, POC: YES

## 2024-01-30 PROCEDURE — 87651 STREP A DNA AMP PROBE: CPT | Performed by: NURSE PRACTITIONER

## 2024-01-30 PROCEDURE — 87502 INFLUENZA DNA AMP PROBE: CPT | Performed by: NURSE PRACTITIONER

## 2024-01-30 PROCEDURE — 87634 RSV DNA/RNA AMP PROBE: CPT | Performed by: NURSE PRACTITIONER

## 2024-01-30 PROCEDURE — 87635 SARS-COV-2 COVID-19 AMP PRB: CPT | Performed by: NURSE PRACTITIONER

## 2024-01-30 PROCEDURE — 99213 OFFICE O/P EST LOW 20 MIN: CPT | Performed by: NURSE PRACTITIONER

## 2024-01-30 ASSESSMENT — PATIENT HEALTH QUESTIONNAIRE - PHQ9
SUM OF ALL RESPONSES TO PHQ9 QUESTIONS 1 & 2: 0
2. FEELING DOWN, DEPRESSED OR HOPELESS: 0
SUM OF ALL RESPONSES TO PHQ QUESTIONS 1-9: 0
1. LITTLE INTEREST OR PLEASURE IN DOING THINGS: 0

## 2024-01-30 NOTE — PROGRESS NOTES
Chief Complaint   Patient presents with    Cough       There were no vitals filed for this visit.

## 2024-01-31 RX ORDER — METHYLPREDNISOLONE 4 MG/1
TABLET ORAL
Qty: 1 KIT | Refills: 0 | Status: SHIPPED | OUTPATIENT
Start: 2024-01-31 | End: 2024-02-06

## 2024-02-02 NOTE — PROGRESS NOTES
Zenaida Blackwell, was evaluated through a synchronous (real-time) audio-video encounter. The patient (or guardian if applicable) is aware that this is a billable service, which includes applicable co-pays. This Virtual Visit was conducted with patient's (and/or legal guardian's) consent. Patient identification was verified, and a caregiver was present when appropriate.   The patient was located at Home: [address] [place] [phone]  Provider was located at Facility (Appt Dept): [address] [place] [phone]  I Confirm that I am appropriately licensed, registered, or certified to deliver care in the state where the patient is located as indicated above.     Zenaida Blackwell (:  1980) is a Established patient, presenting virtually for evaluation of the following:    Assessment & Plan   Below is the assessment and plan developed based on review of pertinent history, physical exam, labs, studies, and medications.  1. Flu-like symptoms  -     AMB POC STREP GO A DIRECT, DNA PROBE  -     AMB POC INFLUENZA A  AND B REAL-TIME RT-PCR  -     AMB POC COVID-19 COV  -     POCT RSV Molecular    No follow-ups on file.       Subjective   HPI  Review of Systems     Ms. Blackwell endorse that she started with head  and  chest congestion, runny nose and a nonproductive cough x 3 days.  She has been taking  a severe cold and sinus  formula with some relief.  She denies fever,chills or body aches.No ill contacts   Objective   Patient-Reported Vitals  No data recorded     Physical Exam      Constitutional: [x] Appears well-developed and well-nourished [x] No apparent distress  . Ill appearing    [] Abnormal -     Mental status: [x] Alert and awake  [x] Oriented to person/place/time [x] Able to follow commands    [] Abnormal -     Eyes:   EOM    [x]  Normal    [] Abnormal -   Sclera  [x]  Normal    [] Abnormal -          Discharge [x]  None visible   [] Abnormal -     HENT: [x] Normocephalic, atraumatic  []

## 2024-05-23 ENCOUNTER — OFFICE VISIT (OUTPATIENT)
Age: 44
End: 2024-05-23

## 2024-05-23 ENCOUNTER — HOSPITAL ENCOUNTER (OUTPATIENT)
Facility: HOSPITAL | Age: 44
Discharge: HOME | End: 2024-05-23
Payer: COMMERCIAL

## 2024-05-23 VITALS
SYSTOLIC BLOOD PRESSURE: 122 MMHG | TEMPERATURE: 97.8 F | HEIGHT: 65 IN | HEART RATE: 80 BPM | RESPIRATION RATE: 16 BRPM | OXYGEN SATURATION: 97 % | DIASTOLIC BLOOD PRESSURE: 88 MMHG | WEIGHT: 293 LBS | BODY MASS INDEX: 48.82 KG/M2

## 2024-05-23 DIAGNOSIS — M79.671 RIGHT FOOT PAIN: ICD-10-CM

## 2024-05-23 DIAGNOSIS — Z76.89 ENCOUNTER FOR WEIGHT MANAGEMENT: ICD-10-CM
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
## 2024-05-23 DIAGNOSIS — M25.472 EDEMA OF LEFT ANKLE: ICD-10-CM

## 2024-05-23 DIAGNOSIS — M25.571 ACUTE RIGHT ANKLE PAIN: ICD-10-CM

## 2024-05-23 DIAGNOSIS — E11.9 COMPREHENSIVE DIABETIC FOOT EXAMINATION, TYPE 2 DM, ENCOUNTER FOR (HCC): ICD-10-CM

## 2024-05-23 DIAGNOSIS — M79.672 LEFT FOOT PAIN: ICD-10-CM
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
## 2024-05-23 DIAGNOSIS — E66.01 MORBID OBESITY (HCC): ICD-10-CM

## 2024-05-23 DIAGNOSIS — Z12.11 SCREENING FOR COLON CANCER: ICD-10-CM

## 2024-05-23 DIAGNOSIS — I10 PRIMARY HYPERTENSION: ICD-10-CM

## 2024-05-23 PROCEDURE — 73610 X-RAY EXAM OF ANKLE: CPT

## 2024-05-23 PROCEDURE — 73630 X-RAY EXAM OF FOOT: CPT

## 2024-05-23 RX ORDER — BUPROPION HYDROCHLORIDE 100 MG/1
TABLET ORAL
Qty: 360 TABLET | Refills: 3 | Status: SHIPPED | OUTPATIENT
Start: 2024-05-23

## 2024-05-23 RX ORDER — FUROSEMIDE 20 MG/1
20 TABLET ORAL DAILY PRN
Qty: 90 TABLET | Refills: 3 | Status: SHIPPED | OUTPATIENT
Start: 2024-05-23

## 2024-05-23 RX ORDER — NALTREXONE HYDROCHLORIDE 50 MG/1
TABLET, FILM COATED ORAL
Qty: 90 TABLET | Refills: 3 | Status: SHIPPED | OUTPATIENT
Start: 2024-05-23

## 2024-05-23 NOTE — PROGRESS NOTES
\"Have you been to the ER, urgent care clinic since your last visit?  Hospitalized since your last visit?\"    NO    “Have you seen or consulted any other health care providers outside of [facility] since your last visit?”    NO     “Have you had a pap smear?”    Yes records requested    No cervical cancer screening on file         “Have you had a colorectal cancer screening such as a colonoscopy/FIT/Cologuard?    NO    No colonoscopy on file  No cologuard on file  No FIT/FOBT on file   No flexible sigmoidoscopy on file         Click Here for Release of Records Request      Chief Complaint   Patient presents with    Fall     1 week ago, pain in right foot         Vitals:    05/23/24 0929   BP: 122/88   Pulse: 80   Resp: 16   Temp: 97.8 °F (36.6 °C)   SpO2: 97%

## 2024-05-24 ENCOUNTER — TELEPHONE (OUTPATIENT)
Age: 44
End: 2024-05-24

## 2024-05-24 NOTE — TELEPHONE ENCOUNTER
----- Message from Bryant Cornell sent at 5/24/2024  1:12 PM EDT -----  Subject: Results Request    QUESTIONS  Results: x ray patient request for the provider to call her in case her   results don't come back until tuesday ; Ordered by: Charity Mitchell   Date Performed: 2024-05-23  ---------------------------------------------------------------------------  --------------  CALL BACK INFO    [phone]; OK to leave message on voicemail,OK to respond with electronic   message via [employer] portal (only for patients who have registered [employer]   account)  ---------------------------------------------------------------------------  --------------

## 2024-05-28 NOTE — TELEPHONE ENCOUNTER
Patient verified stating name and date of birth.  Patient informed of x ray  results and states understanding.  She states her foot is not better and would like a referral placed to see a foot specialist

## 2024-07-26 ENCOUNTER — TELEMEDICINE (OUTPATIENT)
Age: 44
End: 2024-07-26

## 2024-07-26 DIAGNOSIS — R06.83 SNORING: Primary | ICD-10-CM

## 2024-07-26 DIAGNOSIS — R09.81 SINUS CONGESTION: ICD-10-CM

## 2024-07-26 DIAGNOSIS — R53.83 OTHER FATIGUE: ICD-10-CM

## 2024-07-26 RX ORDER — METHYLPREDNISOLONE 4 MG/1
TABLET ORAL
Qty: 1 KIT | Refills: 0 | Status: SHIPPED | OUTPATIENT
Start: 2024-07-26 | End: 2024-08-01

## 2024-07-26 NOTE — PROGRESS NOTES
Yes, I confirm.       Total time spent on this encounter: Not billed by time    --Kristi Eldridge, [place] - NP on 7/26/2024 at 11:25 AM    An electronic signature was used to authenticate this note.

## 2024-10-07 ENCOUNTER — TELEMEDICINE (OUTPATIENT)
Age: 44
End: 2024-10-07
Payer: COMMERCIAL

## 2024-10-07 DIAGNOSIS — R10.9 FLANK PAIN: Primary | ICD-10-CM

## 2024-10-07 PROCEDURE — 99214 OFFICE O/P EST MOD 30 MIN: CPT | Performed by: NURSE PRACTITIONER

## 2024-10-07 RX ORDER — TAMSULOSIN HYDROCHLORIDE 0.4 MG/1
0.4 CAPSULE ORAL
Qty: 10 CAPSULE | Refills: 0 | Status: SHIPPED | OUTPATIENT
Start: 2024-10-07 | End: 2024-10-17

## 2024-10-07 RX ORDER — KETOROLAC TROMETHAMINE 10 MG/1
10 TABLET, FILM COATED ORAL 3 TIMES DAILY PRN
Qty: 15 TABLET | Refills: 0 | Status: SHIPPED | OUTPATIENT
Start: 2024-10-07 | End: 2025-10-07

## 2024-10-07 ASSESSMENT — PATIENT HEALTH QUESTIONNAIRE - PHQ9
2. FEELING DOWN, DEPRESSED OR HOPELESS: NOT AT ALL
SUM OF ALL RESPONSES TO PHQ QUESTIONS 1-9: 0
SUM OF ALL RESPONSES TO PHQ9 QUESTIONS 1 & 2: 0
1. LITTLE INTEREST OR PLEASURE IN DOING THINGS: NOT AT ALL

## 2024-10-07 NOTE — PROGRESS NOTES
\"Have you been to the ER, urgent care clinic since your last visit?  Hospitalized since your last visit?\"    NO    “Have you seen or consulted any other health care providers outside our system since your last visit?”    NO    Have you had a mammogram?”   NO    No breast cancer screening on file      “Have you had a pap smear?”    NO    No cervical cancer screening on file       “Have you had a diabetic eye exam?”    NO     No diabetic eye exam on file

## 2024-10-07 NOTE — PROGRESS NOTES
10/7/2024    TELEHEALTH EVALUATION     HPI:    Zenaida Blackwell (:  1980) has requested an audio/video evaluation for the following concern(s):    Lower R stomach and back pain that has come and gone for the past few days, severe episode today when standing up from her chair while working. The pain is severe. H/o kidney stones 6 years ago requiring lithotripsy.     A comprehensive review of systems was negative except for that written in the HPI.    ASSESSMENT/PLAN:    1. Flank pain    - tamsulosin (FLOMAX) 0.4 MG capsule; Take 1 capsule by mouth nightly for 10 days  Dispense: 10 capsule; Refill: 0  - ketorolac (TORADOL) 10 MG tablet; Take 1 tablet by mouth 3 times daily as needed for Pain  Dispense: 15 tablet; Refill: 0  - CT ABDOMEN PELVIS WO CONTRAST Additional Contrast? None; Future    Tx as above. Push fluids, void often. CT ordered, pt to self-schedule at [facility].       No follow-ups on file.    Prior to Visit Medications    Medication Sig Taking? Authorizing Provider   tamsulosin (FLOMAX) 0.4 MG capsule Take 1 capsule by mouth nightly for 10 days Yes Kristi Eldridge, APRN - NP   ketorolac (TORADOL) 10 MG tablet Take 1 tablet by mouth 3 times daily as needed for Pain Yes Kristi Eldridge APRN - NP   buPROPion (WELLBUTRIN) 100 MG tablet 2 tab PO BID  Charity Mitchell, APRN - NP   naltrexone (DEPADE) 50 MG tablet Take 1/4 tablet by mouth in the am for 2 weeks,  Then increase to 1/2 tablet by mouth twice a day maintenance dose.  Charity Mitchell APRN - NP   furosemide (LASIX) 20 MG tablet Take 1 tablet by mouth daily as needed (edema)  Charity Mitchell APRN - NP       Social History     Tobacco Use    Smoking status: Never     Passive exposure: Never    Smokeless tobacco: Never   Vaping Use    Vaping status: Never Used   Substance Use Topics    Alcohol use: Yes    Drug use: Never        PHYSICAL EXAMINATION:      Constitutional: [] Appears well-developed and well-nourished [] No apparent distress      [x]

## 2025-01-03 ENCOUNTER — OFFICE VISIT (OUTPATIENT)
Age: 45
End: 2025-01-03

## 2025-01-03 VITALS
RESPIRATION RATE: 20 BRPM | TEMPERATURE: 97.8 F | BODY MASS INDEX: 48.82 KG/M2 | OXYGEN SATURATION: 98 % | DIASTOLIC BLOOD PRESSURE: 98 MMHG | SYSTOLIC BLOOD PRESSURE: 146 MMHG | HEART RATE: 86 BPM | HEIGHT: 65 IN | WEIGHT: 293 LBS

## 2025-01-03 DIAGNOSIS — R73.01 IFG (IMPAIRED FASTING GLUCOSE): ICD-10-CM

## 2025-01-03 DIAGNOSIS — R53.83 FATIGUE, UNSPECIFIED TYPE: ICD-10-CM

## 2025-01-03 DIAGNOSIS — I10 ESSENTIAL HYPERTENSION: ICD-10-CM

## 2025-01-03 DIAGNOSIS — J06.9 VIRAL URI: Primary | ICD-10-CM

## 2025-01-03 DIAGNOSIS — R31.9 HEMATURIA, UNSPECIFIED TYPE: ICD-10-CM

## 2025-01-03 RX ORDER — FUROSEMIDE 20 MG/1
20 TABLET ORAL DAILY
Qty: 90 TABLET | Refills: 3 | Status: SHIPPED | OUTPATIENT
Start: 2025-01-03

## 2025-01-03 ASSESSMENT — PATIENT HEALTH QUESTIONNAIRE - PHQ9
2. FEELING DOWN, DEPRESSED OR HOPELESS: NOT AT ALL
SUM OF ALL RESPONSES TO PHQ QUESTIONS 1-9: 0
1. LITTLE INTEREST OR PLEASURE IN DOING THINGS: NOT AT ALL
SUM OF ALL RESPONSES TO PHQ QUESTIONS 1-9: 0
SUM OF ALL RESPONSES TO PHQ9 QUESTIONS 1 & 2: 0
SUM OF ALL RESPONSES TO PHQ QUESTIONS 1-9: 0
SUM OF ALL RESPONSES TO PHQ QUESTIONS 1-9: 0

## 2025-01-03 NOTE — PROGRESS NOTES
Chief Complaint   Patient presents with    Follow-up    Follow-Up from Hospital     ER follow-up       ASSESSMENT AND PLAN:       1. Viral URI      2. IFG (impaired fasting glucose)    - COLLECTION VENOUS BLOOD,VENIPUNCTURE  - Hemoglobin A1C; Future  - Albumin/Creatinine Ratio, Urine; Future  - AMB POC URINALYSIS DIP STICK AUTO W/O MICRO  - Albumin/Creatinine Ratio, Urine  - Hemoglobin A1C    3. Essential hypertension    - COLLECTION VENOUS BLOOD,VENIPUNCTURE  - CBC with Auto Differential; Future  - Comprehensive Metabolic Panel; Future  - Lipid Panel; Future  - Vitamin D 25 Hydroxy; Future  - TSH; Future  - furosemide (LASIX) 20 MG tablet; Take 1 tablet by mouth daily  Dispense: 90 tablet; Refill: 3  - TSH  - Vitamin D 25 Hydroxy  - Lipid Panel  - Comprehensive Metabolic Panel  - CBC with Auto Differential    4. Fatigue, unspecified type    - COLLECTION VENOUS BLOOD,VENIPUNCTURE  - Vitamin D 25 Hydroxy; Future  - Vitamin D 25 Hydroxy    5. Hematuria, unspecified type    - Culture, Urine; Future  - AMB POC URINALYSIS DIP STICK AUTO W/O MICRO  - Culture, Urine     Viral illness is improving with time. Tx BP with Lasix as patient also has some edema, weight gain. Start with 20 mg. Keep checking BP at home and send me a log via [facility], may need to combine with ACE or ARB pending results. Check labs as above, follow up UA for hematuria in ER. Work on stress reduction, self-care.     Patient aware of plan of care and verbalized understanding. Questions answered. RTC pending results, or sooner if needed.    HPI:     is a 44 y.o. female in today for ER follow up.    Zenaida was seen at [facility] on 12/29 with a CC of CP, N/V, and headache. Lab work up was unremarkable except for a UA with moderate blood, moderate bacteria. She was dx with a viral illness and discharged on anti-emetics.  She still has a cough with some mild congestion, no fevers. She feels run down. Her blood pressure has been high at the house for

## 2025-01-04 LAB
25(OH)D3 SERPL-MCNC: 34.8 NG/ML (ref 30–100)
ALBUMIN SERPL-MCNC: 3.8 G/DL (ref 3.5–5)
ALBUMIN/GLOB SERPL: 1 (ref 1.1–2.2)
ALP SERPL-CCNC: 90 U/L (ref 45–117)
ALT SERPL-CCNC: 50 U/L (ref 12–78)
ANION GAP SERPL CALC-SCNC: 8 MMOL/L (ref 2–12)
AST SERPL-CCNC: 44 U/L (ref 15–37)
BACTERIA SPEC CULT: ABNORMAL
BACTERIA SPEC CULT: ABNORMAL
BASOPHILS # BLD: 0 K/UL (ref 0–0.1)
BASOPHILS NFR BLD: 0 % (ref 0–1)
BILIRUB SERPL-MCNC: 0.8 MG/DL (ref 0.2–1)
BUN SERPL-MCNC: 9 MG/DL (ref 6–20)
BUN/CREAT SERPL: 14 (ref 12–20)
CALCIUM SERPL-MCNC: 9.5 MG/DL (ref 8.5–10.1)
CC UR VC: ABNORMAL
CHLORIDE SERPL-SCNC: 101 MMOL/L (ref 97–108)
CHOLEST SERPL-MCNC: 167 MG/DL
CO2 SERPL-SCNC: 27 MMOL/L (ref 21–32)
CREAT SERPL-MCNC: 0.63 MG/DL (ref 0.55–1.02)
CREAT UR-MCNC: 331 MG/DL
DIFFERENTIAL METHOD BLD: ABNORMAL
EOSINOPHIL # BLD: 0 K/UL (ref 0–0.4)
EOSINOPHIL NFR BLD: 0 % (ref 0–7)
ERYTHROCYTE [DISTWIDTH] IN BLOOD BY AUTOMATED COUNT: 12.4 % (ref 11.5–14.5)
EST. AVERAGE GLUCOSE BLD GHB EST-MCNC: 94 MG/DL
GLOBULIN SER CALC-MCNC: 3.9 G/DL (ref 2–4)
GLUCOSE SERPL-MCNC: 98 MG/DL (ref 65–100)
HBA1C MFR BLD: 4.9 % (ref 4–5.6)
HCT VFR BLD AUTO: 44.9 % (ref 35–47)
HDLC SERPL-MCNC: 49 MG/DL
HDLC SERPL: 3.4 (ref 0–5)
HGB BLD-MCNC: 15.8 G/DL (ref 11.5–16)
IMM GRANULOCYTES # BLD AUTO: 0 K/UL
IMM GRANULOCYTES NFR BLD AUTO: 0 %
LDLC SERPL CALC-MCNC: 95.6 MG/DL (ref 0–100)
LYMPHOCYTES # BLD: 3.5 K/UL (ref 0.8–3.5)
LYMPHOCYTES NFR BLD: 55 % (ref 12–49)
MCH RBC QN AUTO: 31.3 PG (ref 26–34)
MCHC RBC AUTO-ENTMCNC: 35.2 G/DL (ref 30–36.5)
MCV RBC AUTO: 88.9 FL (ref 80–99)
MICROALBUMIN UR-MCNC: 5.71 MG/DL
MICROALBUMIN/CREAT UR-RTO: 17 MG/G (ref 0–30)
MONOCYTES # BLD: 0.3 K/UL (ref 0–1)
MONOCYTES NFR BLD: 5 % (ref 5–13)
NEUTS BAND NFR BLD MANUAL: 1 % (ref 0–6)
NEUTS SEG # BLD: 2.5 K/UL (ref 1.8–8)
NEUTS SEG NFR BLD: 39 % (ref 32–75)
NRBC # BLD: 0 K/UL (ref 0–0.01)
NRBC BLD-RTO: 0 PER 100 WBC
PLATELET # BLD AUTO: 185 K/UL (ref 150–400)
PMV BLD AUTO: 11.3 FL (ref 8.9–12.9)
POTASSIUM SERPL-SCNC: 3.4 MMOL/L (ref 3.5–5.1)
PROT SERPL-MCNC: 7.7 G/DL (ref 6.4–8.2)
RBC # BLD AUTO: 5.05 M/UL (ref 3.8–5.2)
RBC MORPH BLD: ABNORMAL
SERVICE CMNT-IMP: ABNORMAL
SODIUM SERPL-SCNC: 136 MMOL/L (ref 136–145)
TRIGL SERPL-MCNC: 112 MG/DL
TSH SERPL DL<=0.05 MIU/L-ACNC: 3.25 UIU/ML (ref 0.36–3.74)
VLDLC SERPL CALC-MCNC: 22.4 MG/DL
WBC # BLD AUTO: 6.3 K/UL (ref 3.6–11)
WBC MORPH BLD: ABNORMAL

## 2025-07-09 ENCOUNTER — OFFICE VISIT (OUTPATIENT)
Age: 45
End: 2025-07-09
Payer: COMMERCIAL

## 2025-07-09 VITALS
SYSTOLIC BLOOD PRESSURE: 122 MMHG | DIASTOLIC BLOOD PRESSURE: 90 MMHG | RESPIRATION RATE: 16 BRPM | WEIGHT: 293 LBS | HEIGHT: 65 IN | TEMPERATURE: 97.6 F | BODY MASS INDEX: 48.82 KG/M2 | HEART RATE: 81 BPM | OXYGEN SATURATION: 100 %

## 2025-07-09 DIAGNOSIS — J02.9 SORE THROAT: Primary | ICD-10-CM

## 2025-07-09 DIAGNOSIS — R05.8 RESPIRATORY TRACT CONGESTION WITH COUGH: ICD-10-CM

## 2025-07-09 LAB
GROUP A STREP ANTIGEN, POC: NEGATIVE
VALID INTERNAL CONTROL, POC: YES

## 2025-07-09 PROCEDURE — 87880 STREP A ASSAY W/OPTIC: CPT | Performed by: NURSE PRACTITIONER

## 2025-07-09 PROCEDURE — 3080F DIAST BP >= 90 MM HG: CPT | Performed by: NURSE PRACTITIONER

## 2025-07-09 PROCEDURE — 99213 OFFICE O/P EST LOW 20 MIN: CPT | Performed by: NURSE PRACTITIONER

## 2025-07-09 PROCEDURE — 3074F SYST BP LT 130 MM HG: CPT | Performed by: NURSE PRACTITIONER

## 2025-07-09 RX ORDER — AZITHROMYCIN 250 MG/1
TABLET, FILM COATED ORAL
Qty: 6 TABLET | Refills: 0 | Status: SHIPPED | OUTPATIENT
Start: 2025-07-09 | End: 2025-07-09 | Stop reason: SDUPTHER

## 2025-07-09 RX ORDER — AZITHROMYCIN 250 MG/1
TABLET, FILM COATED ORAL
Qty: 6 TABLET | Refills: 0 | Status: SHIPPED | OUTPATIENT
Start: 2025-07-09 | End: 2025-07-19

## 2025-07-09 SDOH — ECONOMIC STABILITY: FOOD INSECURITY: WITHIN THE PAST 12 MONTHS, THE FOOD YOU BOUGHT JUST DIDN'T LAST AND YOU DIDN'T HAVE MONEY TO GET MORE.: NEVER TRUE

## 2025-07-09 SDOH — ECONOMIC STABILITY: FOOD INSECURITY: WITHIN THE PAST 12 MONTHS, YOU WORRIED THAT YOUR FOOD WOULD RUN OUT BEFORE YOU GOT MONEY TO BUY MORE.: NEVER TRUE

## 2025-07-09 ASSESSMENT — PATIENT HEALTH QUESTIONNAIRE - PHQ9
SUM OF ALL RESPONSES TO PHQ QUESTIONS 1-9: 0
2. FEELING DOWN, DEPRESSED OR HOPELESS: NOT AT ALL
SUM OF ALL RESPONSES TO PHQ QUESTIONS 1-9: 0
1. LITTLE INTEREST OR PLEASURE IN DOING THINGS: NOT AT ALL
SUM OF ALL RESPONSES TO PHQ QUESTIONS 1-9: 0
SUM OF ALL RESPONSES TO PHQ QUESTIONS 1-9: 0

## (undated) DEVICE — COVERALL PREM SMS 2XL KNIT --

## (undated) DEVICE — SUTURE MCRYL SZ 2-0 L36IN ABSRB UD L36MM CT-1 1/2 CIR Y945H

## (undated) DEVICE — PACK PROCEDURE SURG C SECT KT SMH

## (undated) DEVICE — DEVON™ KNEE AND BODY STRAP 60" X 3" (1.5 M X 7.6 CM): Brand: DEVON

## (undated) DEVICE — PICO 7 10CM X 30CM: Brand: PICO™ 7

## (undated) DEVICE — PENCIL ES L3M BTTN SWCH S STL HEX LOK BLDE ELECTRD HOLSTER

## (undated) DEVICE — TIP CLEANER: Brand: VALLEYLAB

## (undated) DEVICE — ELECTRODE PT RET AD L9FT HI MOIST COND ADH HYDRGEL CORDED

## (undated) DEVICE — SOLUTION IRRIG 1000ML STRL H2O USP PLAS POUR BTL

## (undated) DEVICE — SUTURE MCRYL SZ 4-0 L27IN ABSRB UD L24MM PS-1 3/8 CIR PRIM Y935H

## (undated) DEVICE — GLOVE SURG SZ 7 L12IN FNGR THK79MIL GRN LTX FREE

## (undated) DEVICE — STERILE POLYISOPRENE POWDER-FREE SURGICAL GLOVES: Brand: PROTEXIS

## (undated) DEVICE — SOLIDIFIER MEDC 1200ML -- CONVERT TO 356117

## (undated) DEVICE — DRESSING SIL W4XL5IN ANTIBACT GELLING FBR CYTOFORM

## (undated) DEVICE — APPLICATOR MEDICATED 26 CC SOLUTION HI LT ORNG CHLORAPREP

## (undated) DEVICE — DEVICE ES L3M EDGE COAT HEX LOK BLDE ELECTRD HOLSTER FORC

## (undated) DEVICE — CATH FOLEY 16F LUBRI-SIL IC --

## (undated) DEVICE — MEDI-VAC NON-CONDUCTIVE SUCTION TUBING: Brand: CARDINAL HEALTH

## (undated) DEVICE — ATTACHMENT SMK 3/8INX10FT VALLEYLAB

## (undated) DEVICE — 3000CC GUARDIAN II: Brand: GUARDIAN

## (undated) DEVICE — SUTURE STRATAFIX SYMMETRIC SZ 1 L18IN ABSRB VLT CT1 L36CM SXPP1A404

## (undated) DEVICE — ROYALSILK SURGICAL GOWN, L: Brand: CONVERTORS

## (undated) DEVICE — KENDALL SCD EXPRESS SLEEVES, KNEE LENGTH, MEDIUM: Brand: KENDALL SCD

## (undated) DEVICE — LIGHT HANDLE: Brand: DEVON

## (undated) DEVICE — STAPLER SKIN ABSORBABLE 30 STRL INSORB

## (undated) DEVICE — SUTURE VCRL SZ 0 L36IN ABSRB VLT L40MM CT 1/2 CIR J358H

## (undated) DEVICE — SPONGE LAPAROTOMY W18XL18IN WHITE STRUNG RADIOPAQUE STERILE

## (undated) DEVICE — SOLUTION IRRIG 1000ML 0.9% SOD CHL USP POUR PLAS BTL

## (undated) DEVICE — SUTURE MCRYL SZ 0 L36IN ABSRB UD L36MM CT-1 1/2 CIR Y946H

## (undated) DEVICE — POWDER HEMOSTAT GEL 3.0GR -- SURGICEL

## (undated) DEVICE — DRAPE FLD WRM W44XL66IN C6L FOR INTRATEMP SYS THERMABASIN

## (undated) DEVICE — GLOVE SURG SZ 65 THK91MIL LTX FREE SYN POLYISOPRENE